# Patient Record
Sex: FEMALE | Race: WHITE | NOT HISPANIC OR LATINO | Employment: UNEMPLOYED | URBAN - METROPOLITAN AREA
[De-identification: names, ages, dates, MRNs, and addresses within clinical notes are randomized per-mention and may not be internally consistent; named-entity substitution may affect disease eponyms.]

---

## 2021-01-13 LAB
ABO + RH BLD: NORMAL
C TRACH RRNA SPEC QL PROBE: NEGATIVE
HBV SURFACE AG SERPL QL IA: NEGATIVE
HIV 1+2 AB+HIV1 P24 AG SERPL QL IA: NEGATIVE
INDIRECT COOMBS: NEGATIVE
N GONORRHOEAE, AMPLIFIED DNA: NEGATIVE
RPR: NON REACTIVE
RUBELLA IMMUNE STATUS: NORMAL

## 2021-08-02 ENCOUNTER — HOSPITAL ENCOUNTER (OUTPATIENT)
Facility: HOSPITAL | Age: 36
Discharge: HOME | End: 2021-08-02
Attending: OBSTETRICS & GYNECOLOGY | Admitting: OBSTETRICS & GYNECOLOGY
Payer: COMMERCIAL

## 2021-08-02 VITALS — SYSTOLIC BLOOD PRESSURE: 133 MMHG | HEART RATE: 93 BPM | DIASTOLIC BLOOD PRESSURE: 82 MMHG

## 2021-08-02 DIAGNOSIS — O09.529 AMA (ADVANCED MATERNAL AGE) MULTIGRAVIDA 35+: ICD-10-CM

## 2021-08-02 PROCEDURE — 59025 FETAL NON-STRESS TEST: CPT

## 2021-08-05 ENCOUNTER — HOSPITAL ENCOUNTER (OUTPATIENT)
Facility: HOSPITAL | Age: 36
Discharge: HOME | End: 2021-08-06
Attending: OBSTETRICS & GYNECOLOGY | Admitting: OBSTETRICS & GYNECOLOGY
Payer: COMMERCIAL

## 2021-08-05 DIAGNOSIS — O24.419 GESTATIONAL DIABETES: ICD-10-CM

## 2021-08-05 LAB
ALBUMIN SERPL BCP-MCNC: 2.9 G/DL (ref 3.5–5.2)
ALP SERPL-CCNC: 109 U/L (ref 55–135)
ALT SERPL W/O P-5'-P-CCNC: 20 U/L (ref 10–44)
ANION GAP SERPL CALC-SCNC: 10 MMOL/L (ref 8–16)
AST SERPL-CCNC: 24 U/L (ref 10–40)
BACTERIA #/AREA URNS HPF: NEGATIVE /HPF
BILIRUB SERPL-MCNC: 0.5 MG/DL (ref 0.1–1)
BILIRUB UR QL STRIP: NEGATIVE
BUN SERPL-MCNC: 15 MG/DL (ref 6–20)
CALCIUM SERPL-MCNC: 9.5 MG/DL (ref 8.7–10.5)
CHLORIDE SERPL-SCNC: 103 MMOL/L (ref 95–110)
CLARITY UR: CLEAR
CO2 SERPL-SCNC: 22 MMOL/L (ref 23–29)
COLOR UR: YELLOW
CREAT SERPL-MCNC: 0.8 MG/DL (ref 0.5–1.4)
ERYTHROCYTE [DISTWIDTH] IN BLOOD BY AUTOMATED COUNT: 12.5 % (ref 11.5–14.5)
EST. GFR  (AFRICAN AMERICAN): >60 ML/MIN/1.73 M^2
EST. GFR  (NON AFRICAN AMERICAN): >60 ML/MIN/1.73 M^2
GLUCOSE SERPL-MCNC: 120 MG/DL (ref 70–110)
GLUCOSE SERPL-MCNC: 78 MG/DL (ref 70–110)
GLUCOSE UR QL STRIP: NEGATIVE
HCT VFR BLD AUTO: 33.7 % (ref 37–48.5)
HGB BLD-MCNC: 11.5 G/DL (ref 12–16)
HGB UR QL STRIP: NEGATIVE
HYALINE CASTS #/AREA URNS LPF: 0 /LPF
KETONES UR QL STRIP: NEGATIVE
LEUKOCYTE ESTERASE UR QL STRIP: ABNORMAL
MCH RBC QN AUTO: 32.1 PG (ref 27–31)
MCHC RBC AUTO-ENTMCNC: 34.1 G/DL (ref 32–36)
MCV RBC AUTO: 94 FL (ref 82–98)
MICROSCOPIC COMMENT: ABNORMAL
NITRITE UR QL STRIP: NEGATIVE
PH UR STRIP: 6 [PH] (ref 5–8)
PLATELET # BLD AUTO: 243 K/UL (ref 150–450)
PMV BLD AUTO: 11.1 FL (ref 9.2–12.9)
POTASSIUM SERPL-SCNC: 3.7 MMOL/L (ref 3.5–5.1)
PROT SERPL-MCNC: 6 G/DL (ref 6–8.4)
PROT UR QL STRIP: NEGATIVE
RBC # BLD AUTO: 3.58 M/UL (ref 4–5.4)
RBC #/AREA URNS HPF: 0 /HPF (ref 0–4)
SODIUM SERPL-SCNC: 135 MMOL/L (ref 136–145)
SP GR UR STRIP: 1.01 (ref 1–1.03)
SQUAMOUS #/AREA URNS HPF: 1 /HPF
URN SPEC COLLECT METH UR: ABNORMAL
UROBILINOGEN UR STRIP-ACNC: NEGATIVE EU/DL
WBC # BLD AUTO: 10.93 K/UL (ref 3.9–12.7)
WBC #/AREA URNS HPF: 2 /HPF (ref 0–5)

## 2021-08-05 PROCEDURE — 80053 COMPREHEN METABOLIC PANEL: CPT | Performed by: OBSTETRICS & GYNECOLOGY

## 2021-08-05 PROCEDURE — 59025 FETAL NON-STRESS TEST: CPT

## 2021-08-05 PROCEDURE — 85027 COMPLETE CBC AUTOMATED: CPT | Performed by: OBSTETRICS & GYNECOLOGY

## 2021-08-05 PROCEDURE — 36415 COLL VENOUS BLD VENIPUNCTURE: CPT | Performed by: OBSTETRICS & GYNECOLOGY

## 2021-08-05 PROCEDURE — 81001 URINALYSIS AUTO W/SCOPE: CPT | Performed by: OBSTETRICS & GYNECOLOGY

## 2021-08-06 VITALS
HEART RATE: 81 BPM | RESPIRATION RATE: 16 BRPM | WEIGHT: 117 LBS | OXYGEN SATURATION: 98 % | TEMPERATURE: 98 F | HEIGHT: 62 IN | SYSTOLIC BLOOD PRESSURE: 124 MMHG | BODY MASS INDEX: 21.53 KG/M2 | DIASTOLIC BLOOD PRESSURE: 69 MMHG

## 2021-08-06 LAB
GLUCOSE SERPL-MCNC: 114 MG/DL (ref 70–110)
GLUCOSE SERPL-MCNC: 73 MG/DL (ref 70–110)
GLUCOSE SERPL-MCNC: 78 MG/DL (ref 70–110)
PROT 24H UR-MRATE: 108 MG/SPEC (ref 6–100)
PROT UR-MCNC: 9 MG/DL (ref 0–15)
URINE COLLECTION DURATION: 24 HR
URINE VOLUME: 1200 ML

## 2021-08-06 PROCEDURE — G0378 HOSPITAL OBSERVATION PER HR: HCPCS

## 2021-08-06 PROCEDURE — 99211 OFF/OP EST MAY X REQ PHY/QHP: CPT

## 2021-08-06 PROCEDURE — 84156 ASSAY OF PROTEIN URINE: CPT | Performed by: OBSTETRICS & GYNECOLOGY

## 2021-08-06 PROCEDURE — 25000003 PHARM REV CODE 250: Performed by: OBSTETRICS & GYNECOLOGY

## 2021-08-06 RX ORDER — ACETAMINOPHEN 325 MG/1
650 TABLET ORAL EVERY 6 HOURS PRN
Status: DISCONTINUED | OUTPATIENT
Start: 2021-08-06 | End: 2021-08-06 | Stop reason: HOSPADM

## 2021-08-06 RX ORDER — LABETALOL 100 MG/1
100 TABLET, FILM COATED ORAL EVERY 12 HOURS
Status: DISCONTINUED | OUTPATIENT
Start: 2021-08-06 | End: 2021-08-06 | Stop reason: HOSPADM

## 2021-08-06 RX ADMIN — LABETALOL HCL 100 MG: 100 TABLET, FILM COATED ORAL at 09:08

## 2021-08-06 RX ADMIN — ACETAMINOPHEN 650 MG: 325 TABLET, FILM COATED ORAL at 02:08

## 2021-08-09 ENCOUNTER — HOSPITAL ENCOUNTER (OUTPATIENT)
Facility: HOSPITAL | Age: 36
Discharge: HOME | End: 2021-08-09
Attending: OBSTETRICS & GYNECOLOGY | Admitting: OBSTETRICS & GYNECOLOGY
Payer: COMMERCIAL

## 2021-08-09 VITALS — DIASTOLIC BLOOD PRESSURE: 78 MMHG | SYSTOLIC BLOOD PRESSURE: 146 MMHG | RESPIRATION RATE: 16 BRPM | HEART RATE: 76 BPM

## 2021-08-09 DIAGNOSIS — O24.419 GESTATIONAL DIABETES: ICD-10-CM

## 2021-08-09 PROCEDURE — 59025 FETAL NON-STRESS TEST: CPT

## 2021-08-10 ENCOUNTER — HOSPITAL ENCOUNTER (OUTPATIENT)
Facility: HOSPITAL | Age: 36
Discharge: HOME | End: 2021-08-10
Attending: OBSTETRICS & GYNECOLOGY | Admitting: OBSTETRICS & GYNECOLOGY
Payer: COMMERCIAL

## 2021-08-10 DIAGNOSIS — O36.5990 IUGR, ANTENATAL: ICD-10-CM

## 2021-08-10 PROCEDURE — 96372 THER/PROPH/DIAG INJ SC/IM: CPT

## 2021-08-10 PROCEDURE — 63600175 PHARM REV CODE 636 W HCPCS: Performed by: OBSTETRICS & GYNECOLOGY

## 2021-08-10 RX ORDER — BETAMETHASONE SODIUM PHOSPHATE AND BETAMETHASONE ACETATE 3; 3 MG/ML; MG/ML
12 INJECTION, SUSPENSION INTRA-ARTICULAR; INTRALESIONAL; INTRAMUSCULAR; SOFT TISSUE ONCE
Status: COMPLETED | OUTPATIENT
Start: 2021-08-10 | End: 2021-08-10

## 2021-08-10 RX ADMIN — BETAMETHASONE ACETATE AND BETAMETHASONE SODIUM PHOSPHATE 12 MG: 3; 3 INJECTION, SUSPENSION INTRA-ARTICULAR; INTRALESIONAL; INTRAMUSCULAR; SOFT TISSUE at 11:08

## 2021-08-11 ENCOUNTER — HOSPITAL ENCOUNTER (OUTPATIENT)
Facility: HOSPITAL | Age: 36
Discharge: HOME | End: 2021-08-11
Attending: OBSTETRICS & GYNECOLOGY | Admitting: OBSTETRICS & GYNECOLOGY
Payer: COMMERCIAL

## 2021-08-11 DIAGNOSIS — O36.5990 IUGR (INTRAUTERINE GROWTH RESTRICTION) AFFECTING CARE OF MOTHER: ICD-10-CM

## 2021-08-11 PROCEDURE — 63600175 PHARM REV CODE 636 W HCPCS: Performed by: OBSTETRICS & GYNECOLOGY

## 2021-08-11 PROCEDURE — 96372 THER/PROPH/DIAG INJ SC/IM: CPT

## 2021-08-11 RX ORDER — BETAMETHASONE SODIUM PHOSPHATE AND BETAMETHASONE ACETATE 3; 3 MG/ML; MG/ML
12 INJECTION, SUSPENSION INTRA-ARTICULAR; INTRALESIONAL; INTRAMUSCULAR; SOFT TISSUE ONCE
Status: COMPLETED | OUTPATIENT
Start: 2021-08-11 | End: 2021-08-11

## 2021-08-11 RX ADMIN — BETAMETHASONE ACETATE AND BETAMETHASONE SODIUM PHOSPHATE 12 MG: 3; 3 INJECTION, SUSPENSION INTRA-ARTICULAR; INTRALESIONAL; INTRAMUSCULAR; SOFT TISSUE at 11:08

## 2021-08-12 ENCOUNTER — HOSPITAL ENCOUNTER (OUTPATIENT)
Facility: HOSPITAL | Age: 36
Discharge: HOME | End: 2021-08-12
Attending: OBSTETRICS & GYNECOLOGY | Admitting: OBSTETRICS & GYNECOLOGY
Payer: COMMERCIAL

## 2021-08-12 VITALS — RESPIRATION RATE: 16 BRPM | DIASTOLIC BLOOD PRESSURE: 62 MMHG | HEART RATE: 88 BPM | SYSTOLIC BLOOD PRESSURE: 127 MMHG

## 2021-08-12 DIAGNOSIS — O24.419 GESTATIONAL DIABETES: ICD-10-CM

## 2021-08-12 PROCEDURE — 59025 FETAL NON-STRESS TEST: CPT

## 2021-08-19 ENCOUNTER — ANESTHESIA EVENT (OUTPATIENT)
Dept: OBSTETRICS AND GYNECOLOGY | Facility: HOSPITAL | Age: 36
End: 2021-08-19
Payer: COMMERCIAL

## 2021-08-19 ENCOUNTER — ANESTHESIA (OUTPATIENT)
Dept: OBSTETRICS AND GYNECOLOGY | Facility: HOSPITAL | Age: 36
End: 2021-08-19
Payer: COMMERCIAL

## 2021-08-19 ENCOUNTER — HOSPITAL ENCOUNTER (INPATIENT)
Facility: HOSPITAL | Age: 36
LOS: 2 days | Discharge: HOME | End: 2021-08-21
Attending: OBSTETRICS & GYNECOLOGY | Admitting: OBSTETRICS & GYNECOLOGY
Payer: COMMERCIAL

## 2021-08-19 DIAGNOSIS — Z34.90 ENCOUNTER FOR ELECTIVE INDUCTION OF LABOR: ICD-10-CM

## 2021-08-19 LAB
ABO + RH BLD: NORMAL
ALBUMIN SERPL BCP-MCNC: 2.8 G/DL (ref 3.5–5.2)
ALP SERPL-CCNC: 140 U/L (ref 55–135)
ALT SERPL W/O P-5'-P-CCNC: 21 U/L (ref 10–44)
AMPHET+METHAMPHET UR QL: NEGATIVE
ANION GAP SERPL CALC-SCNC: 11 MMOL/L (ref 8–16)
AST SERPL-CCNC: 21 U/L (ref 10–40)
BARBITURATES UR QL SCN>200 NG/ML: NEGATIVE
BASOPHILS # BLD AUTO: 0.02 K/UL (ref 0–0.2)
BASOPHILS NFR BLD: 0.2 % (ref 0–1.9)
BENZODIAZ UR QL SCN>200 NG/ML: NEGATIVE
BILIRUB SERPL-MCNC: 0.7 MG/DL (ref 0.1–1)
BILIRUB UR QL STRIP: NEGATIVE
BLD GP AB SCN CELLS X3 SERPL QL: NORMAL
BUN SERPL-MCNC: 21 MG/DL (ref 6–20)
BUPRENORPHINE UR QL: NEGATIVE
BZE UR QL SCN: NEGATIVE
CALCIUM SERPL-MCNC: 9.1 MG/DL (ref 8.7–10.5)
CANNABINOIDS UR QL SCN: NEGATIVE
CHLORIDE SERPL-SCNC: 105 MMOL/L (ref 95–110)
CLARITY UR: CLEAR
CO2 SERPL-SCNC: 19 MMOL/L (ref 23–29)
COLOR UR: YELLOW
CREAT SERPL-MCNC: 0.7 MG/DL (ref 0.5–1.4)
CREAT UR-MCNC: 51 MG/DL (ref 15–325)
DIFFERENTIAL METHOD: ABNORMAL
EOSINOPHIL # BLD AUTO: 0.2 K/UL (ref 0–0.5)
EOSINOPHIL NFR BLD: 1.7 % (ref 0–8)
ERYTHROCYTE [DISTWIDTH] IN BLOOD BY AUTOMATED COUNT: 12.7 % (ref 11.5–14.5)
EST. GFR  (AFRICAN AMERICAN): >60 ML/MIN/1.73 M^2
EST. GFR  (NON AFRICAN AMERICAN): >60 ML/MIN/1.73 M^2
GLUCOSE SERPL-MCNC: 93 MG/DL (ref 70–110)
GLUCOSE UR QL STRIP: NEGATIVE
HCT VFR BLD AUTO: 36.6 % (ref 37–48.5)
HGB BLD-MCNC: 12.8 G/DL (ref 12–16)
HGB UR QL STRIP: NEGATIVE
IMM GRANULOCYTES # BLD AUTO: 0.06 K/UL (ref 0–0.04)
IMM GRANULOCYTES NFR BLD AUTO: 0.6 % (ref 0–0.5)
KETONES UR QL STRIP: NEGATIVE
LEUKOCYTE ESTERASE UR QL STRIP: NEGATIVE
LYMPHOCYTES # BLD AUTO: 2.5 K/UL (ref 1–4.8)
LYMPHOCYTES NFR BLD: 25.5 % (ref 18–48)
MCH RBC QN AUTO: 32.8 PG (ref 27–31)
MCHC RBC AUTO-ENTMCNC: 35 G/DL (ref 32–36)
MCV RBC AUTO: 94 FL (ref 82–98)
MONOCYTES # BLD AUTO: 1 K/UL (ref 0.3–1)
MONOCYTES NFR BLD: 10.1 % (ref 4–15)
NEUTROPHILS # BLD AUTO: 6 K/UL (ref 1.8–7.7)
NEUTROPHILS NFR BLD: 61.9 % (ref 38–73)
NITRITE UR QL STRIP: NEGATIVE
NRBC BLD-RTO: 0 /100 WBC
OPIATES UR QL SCN: NEGATIVE
PCP UR QL SCN>25 NG/ML: NEGATIVE
PH UR STRIP: 6 [PH] (ref 5–8)
PLATELET # BLD AUTO: 239 K/UL (ref 150–450)
PMV BLD AUTO: 10.8 FL (ref 9.2–12.9)
POTASSIUM SERPL-SCNC: 4 MMOL/L (ref 3.5–5.1)
PROT SERPL-MCNC: 6.3 G/DL (ref 6–8.4)
PROT UR QL STRIP: NEGATIVE
RBC # BLD AUTO: 3.9 M/UL (ref 4–5.4)
RPR SER QL: NORMAL
SARS-COV-2 RDRP RESP QL NAA+PROBE: NEGATIVE
SODIUM SERPL-SCNC: 135 MMOL/L (ref 136–145)
SP GR UR STRIP: 1.01 (ref 1–1.03)
TOXICOLOGY INFORMATION: NORMAL
URN SPEC COLLECT METH UR: NORMAL
UROBILINOGEN UR STRIP-ACNC: NEGATIVE EU/DL
WBC # BLD AUTO: 9.75 K/UL (ref 3.9–12.7)

## 2021-08-19 PROCEDURE — 80307 DRUG TEST PRSMV CHEM ANLYZR: CPT | Performed by: OBSTETRICS & GYNECOLOGY

## 2021-08-19 PROCEDURE — 25000003 PHARM REV CODE 250: Performed by: OBSTETRICS & GYNECOLOGY

## 2021-08-19 PROCEDURE — 85025 COMPLETE CBC W/AUTO DIFF WBC: CPT | Performed by: OBSTETRICS & GYNECOLOGY

## 2021-08-19 PROCEDURE — 62326 NJX INTERLAMINAR LMBR/SAC: CPT | Performed by: STUDENT IN AN ORGANIZED HEALTH CARE EDUCATION/TRAINING PROGRAM

## 2021-08-19 PROCEDURE — 81003 URINALYSIS AUTO W/O SCOPE: CPT | Performed by: OBSTETRICS & GYNECOLOGY

## 2021-08-19 PROCEDURE — 63600175 PHARM REV CODE 636 W HCPCS: Performed by: OBSTETRICS & GYNECOLOGY

## 2021-08-19 PROCEDURE — 86592 SYPHILIS TEST NON-TREP QUAL: CPT | Performed by: OBSTETRICS & GYNECOLOGY

## 2021-08-19 PROCEDURE — 80053 COMPREHEN METABOLIC PANEL: CPT | Performed by: OBSTETRICS & GYNECOLOGY

## 2021-08-19 PROCEDURE — 12000002 HC ACUTE/MED SURGE SEMI-PRIVATE ROOM

## 2021-08-19 PROCEDURE — 27200710 HC EPIDURAL INFUSION PUMP SET: Performed by: STUDENT IN AN ORGANIZED HEALTH CARE EDUCATION/TRAINING PROGRAM

## 2021-08-19 PROCEDURE — 72200004 HC VAGINAL DELIVERY LEVEL I

## 2021-08-19 PROCEDURE — 25000003 PHARM REV CODE 250: Performed by: ANESTHESIOLOGY

## 2021-08-19 PROCEDURE — C1751 CATH, INF, PER/CENT/MIDLINE: HCPCS | Performed by: STUDENT IN AN ORGANIZED HEALTH CARE EDUCATION/TRAINING PROGRAM

## 2021-08-19 PROCEDURE — 86900 BLOOD TYPING SEROLOGIC ABO: CPT | Performed by: OBSTETRICS & GYNECOLOGY

## 2021-08-19 PROCEDURE — U0002 COVID-19 LAB TEST NON-CDC: HCPCS | Performed by: OBSTETRICS & GYNECOLOGY

## 2021-08-19 RX ORDER — DIPHENHYDRAMINE HYDROCHLORIDE 50 MG/ML
12.5 INJECTION INTRAMUSCULAR; INTRAVENOUS EVERY 4 HOURS PRN
Status: DISCONTINUED | OUTPATIENT
Start: 2021-08-19 | End: 2021-08-19

## 2021-08-19 RX ORDER — SODIUM CHLORIDE 0.9 % (FLUSH) 0.9 %
10 SYRINGE (ML) INJECTION
Status: DISCONTINUED | OUTPATIENT
Start: 2021-08-19 | End: 2021-08-21 | Stop reason: HOSPADM

## 2021-08-19 RX ORDER — BUPIVACAINE HYDROCHLORIDE 5 MG/ML
INJECTION, SOLUTION EPIDURAL; INTRACAUDAL
Status: DISCONTINUED | OUTPATIENT
Start: 2021-08-19 | End: 2021-08-19

## 2021-08-19 RX ORDER — PROCHLORPERAZINE EDISYLATE 5 MG/ML
5 INJECTION INTRAMUSCULAR; INTRAVENOUS EVERY 6 HOURS PRN
Status: DISCONTINUED | OUTPATIENT
Start: 2021-08-19 | End: 2021-08-21 | Stop reason: HOSPADM

## 2021-08-19 RX ORDER — LIDOCAINE HYDROCHLORIDE 20 MG/ML
10 INJECTION, SOLUTION EPIDURAL; INFILTRATION; INTRACAUDAL; PERINEURAL ONCE
Status: COMPLETED | OUTPATIENT
Start: 2021-08-19 | End: 2021-08-19

## 2021-08-19 RX ORDER — EPHEDRINE SULFATE 50 MG/ML
10 INJECTION, SOLUTION INTRAVENOUS ONCE AS NEEDED
Status: DISCONTINUED | OUTPATIENT
Start: 2021-08-19 | End: 2021-08-19

## 2021-08-19 RX ORDER — CALCIUM CARBONATE 200(500)MG
500 TABLET,CHEWABLE ORAL 3 TIMES DAILY PRN
Status: DISCONTINUED | OUTPATIENT
Start: 2021-08-19 | End: 2021-08-19

## 2021-08-19 RX ORDER — METHYLERGONOVINE MALEATE 0.2 MG/ML
200 INJECTION INTRAVENOUS
Status: DISCONTINUED | OUTPATIENT
Start: 2021-08-19 | End: 2021-08-19

## 2021-08-19 RX ORDER — ONDANSETRON 2 MG/ML
4 INJECTION INTRAMUSCULAR; INTRAVENOUS EVERY 6 HOURS PRN
Status: DISCONTINUED | OUTPATIENT
Start: 2021-08-19 | End: 2021-08-19

## 2021-08-19 RX ORDER — DIPHENHYDRAMINE HCL 25 MG
25 CAPSULE ORAL EVERY 4 HOURS PRN
Status: DISCONTINUED | OUTPATIENT
Start: 2021-08-19 | End: 2021-08-21 | Stop reason: HOSPADM

## 2021-08-19 RX ORDER — SODIUM CHLORIDE 9 MG/ML
INJECTION, SOLUTION INTRAVENOUS
Status: DISCONTINUED | OUTPATIENT
Start: 2021-08-19 | End: 2021-08-19

## 2021-08-19 RX ORDER — OXYCODONE AND ACETAMINOPHEN 5; 325 MG/1; MG/1
1 TABLET ORAL EVERY 4 HOURS PRN
Status: DISCONTINUED | OUTPATIENT
Start: 2021-08-19 | End: 2021-08-21 | Stop reason: HOSPADM

## 2021-08-19 RX ORDER — DOCUSATE SODIUM 100 MG/1
200 CAPSULE, LIQUID FILLED ORAL 2 TIMES DAILY PRN
Status: DISCONTINUED | OUTPATIENT
Start: 2021-08-19 | End: 2021-08-21 | Stop reason: HOSPADM

## 2021-08-19 RX ORDER — FENTANYL/BUPIVACAINE/NS/PF 2-625MCG/1
PLASTIC BAG, INJECTION (ML) INJECTION
Status: DISPENSED
Start: 2021-08-19 | End: 2021-08-19

## 2021-08-19 RX ORDER — ONDANSETRON 4 MG/1
8 TABLET, ORALLY DISINTEGRATING ORAL EVERY 8 HOURS PRN
Status: DISCONTINUED | OUTPATIENT
Start: 2021-08-19 | End: 2021-08-21 | Stop reason: HOSPADM

## 2021-08-19 RX ORDER — LABETALOL 200 MG/1
200 TABLET, FILM COATED ORAL EVERY 12 HOURS
Status: DISCONTINUED | OUTPATIENT
Start: 2021-08-19 | End: 2021-08-19

## 2021-08-19 RX ORDER — BUTORPHANOL TARTRATE 2 MG/ML
1 INJECTION INTRAMUSCULAR; INTRAVENOUS
Status: DISCONTINUED | OUTPATIENT
Start: 2021-08-19 | End: 2021-08-19

## 2021-08-19 RX ORDER — MISOPROSTOL 200 UG/1
800 TABLET ORAL
Status: DISCONTINUED | OUTPATIENT
Start: 2021-08-19 | End: 2021-08-19

## 2021-08-19 RX ORDER — LABETALOL 100 MG/1
100 TABLET, FILM COATED ORAL ONCE
Status: DISCONTINUED | OUTPATIENT
Start: 2021-08-19 | End: 2021-08-19

## 2021-08-19 RX ORDER — CARBOPROST TROMETHAMINE 250 UG/ML
250 INJECTION, SOLUTION INTRAMUSCULAR
Status: DISCONTINUED | OUTPATIENT
Start: 2021-08-19 | End: 2021-08-19

## 2021-08-19 RX ORDER — FENTANYL/BUPIVACAINE/NS/PF 2-625MCG/1
14 PLASTIC BAG, INJECTION (ML) INJECTION CONTINUOUS
Status: DISCONTINUED | OUTPATIENT
Start: 2021-08-19 | End: 2021-08-19

## 2021-08-19 RX ORDER — HYDROCORTISONE 25 MG/G
CREAM TOPICAL 3 TIMES DAILY PRN
Status: DISCONTINUED | OUTPATIENT
Start: 2021-08-19 | End: 2021-08-21 | Stop reason: HOSPADM

## 2021-08-19 RX ORDER — OXYCODONE AND ACETAMINOPHEN 10; 325 MG/1; MG/1
1 TABLET ORAL EVERY 4 HOURS PRN
Status: DISCONTINUED | OUTPATIENT
Start: 2021-08-19 | End: 2021-08-21 | Stop reason: HOSPADM

## 2021-08-19 RX ORDER — NALOXONE HCL 0.4 MG/ML
0.4 VIAL (ML) INJECTION SEE ADMIN INSTRUCTIONS
Status: DISCONTINUED | OUTPATIENT
Start: 2021-08-19 | End: 2021-08-19

## 2021-08-19 RX ORDER — LABETALOL 200 MG/1
200 TABLET, FILM COATED ORAL 2 TIMES DAILY
COMMUNITY
End: 2022-03-15

## 2021-08-19 RX ORDER — SODIUM CHLORIDE, SODIUM LACTATE, POTASSIUM CHLORIDE, CALCIUM CHLORIDE 600; 310; 30; 20 MG/100ML; MG/100ML; MG/100ML; MG/100ML
INJECTION, SOLUTION INTRAVENOUS CONTINUOUS
Status: DISCONTINUED | OUTPATIENT
Start: 2021-08-19 | End: 2021-08-19

## 2021-08-19 RX ORDER — SIMETHICONE 80 MG
1 TABLET,CHEWABLE ORAL EVERY 6 HOURS PRN
Status: DISCONTINUED | OUTPATIENT
Start: 2021-08-19 | End: 2021-08-21 | Stop reason: HOSPADM

## 2021-08-19 RX ORDER — PRENATAL WITH FERROUS FUM AND FOLIC ACID 3080; 920; 120; 400; 22; 1.84; 3; 20; 10; 1; 12; 200; 27; 25; 2 [IU]/1; [IU]/1; MG/1; [IU]/1; MG/1; MG/1; MG/1; MG/1; MG/1; MG/1; UG/1; MG/1; MG/1; MG/1; MG/1
1 TABLET ORAL DAILY
Status: DISCONTINUED | OUTPATIENT
Start: 2021-08-20 | End: 2021-08-21 | Stop reason: HOSPADM

## 2021-08-19 RX ORDER — LABETALOL 200 MG/1
200 TABLET, FILM COATED ORAL EVERY 12 HOURS
Status: DISCONTINUED | OUTPATIENT
Start: 2021-08-19 | End: 2021-08-20

## 2021-08-19 RX ORDER — OXYTOCIN-SODIUM CHLORIDE 0.9% IV SOLN 30 UNIT/500ML 30-0.9/5 UT/ML-%
30 SOLUTION INTRAVENOUS ONCE
Status: COMPLETED | OUTPATIENT
Start: 2021-08-19 | End: 2021-08-19

## 2021-08-19 RX ORDER — ONDANSETRON 4 MG/1
8 TABLET, ORALLY DISINTEGRATING ORAL EVERY 8 HOURS PRN
Status: DISCONTINUED | OUTPATIENT
Start: 2021-08-19 | End: 2021-08-19

## 2021-08-19 RX ORDER — OXYTOCIN-SODIUM CHLORIDE 0.9% IV SOLN 30 UNIT/500ML 30-0.9/5 UT/ML-%
0-30 SOLUTION INTRAVENOUS CONTINUOUS
Status: DISCONTINUED | OUTPATIENT
Start: 2021-08-19 | End: 2021-08-19

## 2021-08-19 RX ADMIN — DEXTROSE 3 MILLION UNITS: 50 INJECTION, SOLUTION INTRAVENOUS at 09:08

## 2021-08-19 RX ADMIN — DEXTROSE 3 MILLION UNITS: 50 INJECTION, SOLUTION INTRAVENOUS at 01:08

## 2021-08-19 RX ADMIN — SODIUM CHLORIDE: 0.9 INJECTION, SOLUTION INTRAVENOUS at 03:08

## 2021-08-19 RX ADMIN — BENZOCAINE AND LEVOMENTHOL: 200; 5 SPRAY TOPICAL at 08:08

## 2021-08-19 RX ADMIN — OXYCODONE HYDROCHLORIDE AND ACETAMINOPHEN 1 TABLET: 10; 325 TABLET ORAL at 08:08

## 2021-08-19 RX ADMIN — Medication 30 UNITS: at 03:08

## 2021-08-19 RX ADMIN — LABETALOL HYDROCHLORIDE 200 MG: 200 TABLET, FILM COATED ORAL at 09:08

## 2021-08-19 RX ADMIN — IBUPROFEN 600 MG: 400 TABLET ORAL at 06:08

## 2021-08-19 RX ADMIN — BUPIVACAINE HYDROCHLORIDE 4 MG: 5 INJECTION, SOLUTION EPIDURAL; INTRACAUDAL; PERINEURAL at 03:08

## 2021-08-19 RX ADMIN — LIDOCAINE HYDROCHLORIDE 200 MG: 20 INJECTION, SOLUTION EPIDURAL; INFILTRATION; INTRACAUDAL; PERINEURAL at 03:08

## 2021-08-19 RX ADMIN — SODIUM CHLORIDE, SODIUM LACTATE, POTASSIUM CHLORIDE, AND CALCIUM CHLORIDE 1000 ML: .6; .31; .03; .02 INJECTION, SOLUTION INTRAVENOUS at 10:08

## 2021-08-19 RX ADMIN — LABETALOL HYDROCHLORIDE 200 MG: 200 TABLET, FILM COATED ORAL at 08:08

## 2021-08-19 RX ADMIN — DEXTROSE 3 MILLION UNITS: 50 INJECTION, SOLUTION INTRAVENOUS at 05:08

## 2021-08-19 RX ADMIN — PENICILLIN G POTASSIUM 5 MILLION UNITS: 5000000 POWDER, FOR SOLUTION INTRAMUSCULAR; INTRAPLEURAL; INTRATHECAL; INTRAVENOUS at 01:08

## 2021-08-19 RX ADMIN — SODIUM CHLORIDE, SODIUM LACTATE, POTASSIUM CHLORIDE, AND CALCIUM CHLORIDE: .6; .31; .03; .02 INJECTION, SOLUTION INTRAVENOUS at 01:08

## 2021-08-19 RX ADMIN — Medication 2 MILLI-UNITS/MIN: at 02:08

## 2021-08-20 LAB
BASOPHILS # BLD AUTO: 0.02 K/UL (ref 0–0.2)
BASOPHILS NFR BLD: 0.2 % (ref 0–1.9)
DIFFERENTIAL METHOD: ABNORMAL
EOSINOPHIL # BLD AUTO: 0.1 K/UL (ref 0–0.5)
EOSINOPHIL NFR BLD: 1 % (ref 0–8)
ERYTHROCYTE [DISTWIDTH] IN BLOOD BY AUTOMATED COUNT: 12.7 % (ref 11.5–14.5)
HCT VFR BLD AUTO: 30.3 % (ref 37–48.5)
HGB BLD-MCNC: 10 G/DL (ref 12–16)
IMM GRANULOCYTES # BLD AUTO: 0.08 K/UL (ref 0–0.04)
IMM GRANULOCYTES NFR BLD AUTO: 0.6 % (ref 0–0.5)
LYMPHOCYTES # BLD AUTO: 2.6 K/UL (ref 1–4.8)
LYMPHOCYTES NFR BLD: 20.1 % (ref 18–48)
MCH RBC QN AUTO: 31.9 PG (ref 27–31)
MCHC RBC AUTO-ENTMCNC: 33 G/DL (ref 32–36)
MCV RBC AUTO: 97 FL (ref 82–98)
MONOCYTES # BLD AUTO: 1.1 K/UL (ref 0.3–1)
MONOCYTES NFR BLD: 8.7 % (ref 4–15)
NEUTROPHILS # BLD AUTO: 9 K/UL (ref 1.8–7.7)
NEUTROPHILS NFR BLD: 69.4 % (ref 38–73)
NRBC BLD-RTO: 0 /100 WBC
PLATELET # BLD AUTO: 184 K/UL (ref 150–450)
PMV BLD AUTO: 10.8 FL (ref 9.2–12.9)
RBC # BLD AUTO: 3.13 M/UL (ref 4–5.4)
WBC # BLD AUTO: 12.93 K/UL (ref 3.9–12.7)

## 2021-08-20 PROCEDURE — 12000002 HC ACUTE/MED SURGE SEMI-PRIVATE ROOM

## 2021-08-20 PROCEDURE — 25000003 PHARM REV CODE 250: Performed by: OBSTETRICS & GYNECOLOGY

## 2021-08-20 PROCEDURE — 85025 COMPLETE CBC W/AUTO DIFF WBC: CPT | Performed by: OBSTETRICS & GYNECOLOGY

## 2021-08-20 PROCEDURE — 36415 COLL VENOUS BLD VENIPUNCTURE: CPT | Performed by: OBSTETRICS & GYNECOLOGY

## 2021-08-20 RX ORDER — LABETALOL 200 MG/1
200 TABLET, FILM COATED ORAL EVERY 12 HOURS
Status: DISCONTINUED | OUTPATIENT
Start: 2021-08-20 | End: 2021-08-20

## 2021-08-20 RX ORDER — LABETALOL 100 MG/1
200 TABLET, FILM COATED ORAL EVERY 12 HOURS
Status: DISCONTINUED | OUTPATIENT
Start: 2021-08-20 | End: 2021-08-21 | Stop reason: HOSPADM

## 2021-08-20 RX ADMIN — DOCUSATE SODIUM 200 MG: 100 CAPSULE, LIQUID FILLED ORAL at 08:08

## 2021-08-20 RX ADMIN — LABETALOL HYDROCHLORIDE 200 MG: 100 TABLET, FILM COATED ORAL at 09:08

## 2021-08-20 RX ADMIN — LABETALOL HYDROCHLORIDE 200 MG: 200 TABLET, FILM COATED ORAL at 08:08

## 2021-08-20 RX ADMIN — OXYCODONE HYDROCHLORIDE AND ACETAMINOPHEN 1 TABLET: 10; 325 TABLET ORAL at 03:08

## 2021-08-20 RX ADMIN — OXYCODONE HYDROCHLORIDE AND ACETAMINOPHEN 1 TABLET: 10; 325 TABLET ORAL at 07:08

## 2021-08-20 RX ADMIN — PRENATAL VIT W/ FE FUMARATE-FA TAB 27-0.8 MG 1 TABLET: 27-0.8 TAB at 08:08

## 2021-08-21 VITALS
WEIGHT: 120 LBS | HEART RATE: 91 BPM | TEMPERATURE: 99 F | HEIGHT: 62 IN | RESPIRATION RATE: 17 BRPM | SYSTOLIC BLOOD PRESSURE: 135 MMHG | BODY MASS INDEX: 22.08 KG/M2 | OXYGEN SATURATION: 99 % | DIASTOLIC BLOOD PRESSURE: 79 MMHG

## 2021-08-21 PROCEDURE — 25000003 PHARM REV CODE 250: Performed by: OBSTETRICS & GYNECOLOGY

## 2021-08-21 PROCEDURE — 25000003 PHARM REV CODE 250: Performed by: SPECIALIST

## 2021-08-21 RX ORDER — IBUPROFEN 600 MG/1
600 TABLET ORAL EVERY 6 HOURS PRN
Refills: 0
Start: 2021-08-21 | End: 2022-03-15

## 2021-08-21 RX ADMIN — LABETALOL HYDROCHLORIDE 200 MG: 100 TABLET, FILM COATED ORAL at 08:08

## 2021-08-21 RX ADMIN — IBUPROFEN 600 MG: 400 TABLET ORAL at 08:08

## 2021-08-21 RX ADMIN — DOCUSATE SODIUM 200 MG: 100 CAPSULE, LIQUID FILLED ORAL at 08:08

## 2021-08-21 RX ADMIN — PRENATAL VIT W/ FE FUMARATE-FA TAB 27-0.8 MG 1 TABLET: 27-0.8 TAB at 08:08

## 2021-08-21 RX ADMIN — PRAMOXINE HYDROCHLORIDE HYDROCORTISONE ACETATE 1 APPLICATOR: 100; 100 AEROSOL, FOAM TOPICAL at 10:08

## 2022-01-18 LAB
HPV APTIMA: NEGATIVE
PAP RECOMMENDATION EXT: NORMAL

## 2022-01-20 LAB — PAP RECOMMENDATION EXT: NORMAL

## 2022-01-24 ENCOUNTER — TELEPHONE (OUTPATIENT)
Dept: PRIMARY CARE CLINIC | Facility: CLINIC | Age: 37
End: 2022-01-24
Payer: COMMERCIAL

## 2022-01-24 NOTE — TELEPHONE ENCOUNTER
----- Message from Rhona Ag sent at 1/24/2022  8:34 AM CST -----  Contact: [phone]  Doctor appointment and lab have been scheduled.  Please link lab orders to the lab appointment.  Date of doctor appointment: 3/15/22  Date of lab appointment:  3/10/22  Physical or F/U: physical

## 2022-03-15 ENCOUNTER — PATIENT OUTREACH (OUTPATIENT)
Dept: ADMINISTRATIVE | Facility: HOSPITAL | Age: 37
End: 2022-03-15
Payer: COMMERCIAL

## 2022-03-15 ENCOUNTER — PATIENT MESSAGE (OUTPATIENT)
Dept: PRIMARY CARE CLINIC | Facility: CLINIC | Age: 37
End: 2022-03-15

## 2022-03-15 ENCOUNTER — OFFICE VISIT (OUTPATIENT)
Dept: PRIMARY CARE CLINIC | Facility: CLINIC | Age: 37
End: 2022-03-15
Payer: COMMERCIAL

## 2022-03-15 VITALS
RESPIRATION RATE: 18 BRPM | WEIGHT: 94.44 LBS | HEIGHT: 62 IN | OXYGEN SATURATION: 99 % | SYSTOLIC BLOOD PRESSURE: 136 MMHG | BODY MASS INDEX: 17.38 KG/M2 | HEART RATE: 97 BPM | DIASTOLIC BLOOD PRESSURE: 84 MMHG

## 2022-03-15 DIAGNOSIS — Z13.6 ENCOUNTER FOR SCREENING FOR CARDIOVASCULAR DISORDERS: ICD-10-CM

## 2022-03-15 DIAGNOSIS — Z76.89 ENCOUNTER TO ESTABLISH CARE WITH NEW DOCTOR: ICD-10-CM

## 2022-03-15 DIAGNOSIS — Z11.59 NEED FOR HEPATITIS C SCREENING TEST: ICD-10-CM

## 2022-03-15 DIAGNOSIS — Z86.32 HISTORY OF GESTATIONAL DIABETES: ICD-10-CM

## 2022-03-15 DIAGNOSIS — I10 ESSENTIAL HYPERTENSION, BENIGN: Primary | ICD-10-CM

## 2022-03-15 PROBLEM — O36.5990 IUGR (INTRAUTERINE GROWTH RESTRICTION) AFFECTING CARE OF MOTHER: Status: RESOLVED | Noted: 2021-08-11 | Resolved: 2022-03-15

## 2022-03-15 PROBLEM — O09.529 AMA (ADVANCED MATERNAL AGE) MULTIGRAVIDA 35+: Status: RESOLVED | Noted: 2021-08-02 | Resolved: 2022-03-15

## 2022-03-15 PROBLEM — O36.5990 IUGR, ANTENATAL: Status: RESOLVED | Noted: 2021-08-10 | Resolved: 2022-03-15

## 2022-03-15 PROCEDURE — 4010F PR ACE/ARB THEARPY RXD/TAKEN: ICD-10-PCS | Mod: CPTII,S$GLB,, | Performed by: FAMILY MEDICINE

## 2022-03-15 PROCEDURE — 3079F DIAST BP 80-89 MM HG: CPT | Mod: CPTII,S$GLB,, | Performed by: FAMILY MEDICINE

## 2022-03-15 PROCEDURE — 1159F PR MEDICATION LIST DOCUMENTED IN MEDICAL RECORD: ICD-10-PCS | Mod: CPTII,S$GLB,, | Performed by: FAMILY MEDICINE

## 2022-03-15 PROCEDURE — 1160F PR REVIEW ALL MEDS BY PRESCRIBER/CLIN PHARMACIST DOCUMENTED: ICD-10-PCS | Mod: CPTII,S$GLB,, | Performed by: FAMILY MEDICINE

## 2022-03-15 PROCEDURE — 3008F PR BODY MASS INDEX (BMI) DOCUMENTED: ICD-10-PCS | Mod: CPTII,S$GLB,, | Performed by: FAMILY MEDICINE

## 2022-03-15 PROCEDURE — 93005 EKG 12-LEAD: ICD-10-PCS | Mod: S$GLB,,, | Performed by: FAMILY MEDICINE

## 2022-03-15 PROCEDURE — 3079F PR MOST RECENT DIASTOLIC BLOOD PRESSURE 80-89 MM HG: ICD-10-PCS | Mod: CPTII,S$GLB,, | Performed by: FAMILY MEDICINE

## 2022-03-15 PROCEDURE — 93005 ELECTROCARDIOGRAM TRACING: CPT | Mod: S$GLB,,, | Performed by: FAMILY MEDICINE

## 2022-03-15 PROCEDURE — 1160F RVW MEDS BY RX/DR IN RCRD: CPT | Mod: CPTII,S$GLB,, | Performed by: FAMILY MEDICINE

## 2022-03-15 PROCEDURE — 99204 PR OFFICE/OUTPT VISIT, NEW, LEVL IV, 45-59 MIN: ICD-10-PCS | Mod: S$GLB,,, | Performed by: FAMILY MEDICINE

## 2022-03-15 PROCEDURE — 3075F SYST BP GE 130 - 139MM HG: CPT | Mod: CPTII,S$GLB,, | Performed by: FAMILY MEDICINE

## 2022-03-15 PROCEDURE — 3075F PR MOST RECENT SYSTOLIC BLOOD PRESS GE 130-139MM HG: ICD-10-PCS | Mod: CPTII,S$GLB,, | Performed by: FAMILY MEDICINE

## 2022-03-15 PROCEDURE — 1159F MED LIST DOCD IN RCRD: CPT | Mod: CPTII,S$GLB,, | Performed by: FAMILY MEDICINE

## 2022-03-15 PROCEDURE — 3008F BODY MASS INDEX DOCD: CPT | Mod: CPTII,S$GLB,, | Performed by: FAMILY MEDICINE

## 2022-03-15 PROCEDURE — 99999 PR PBB SHADOW E&M-EST. PATIENT-LVL III: ICD-10-PCS | Mod: PBBFAC,,, | Performed by: FAMILY MEDICINE

## 2022-03-15 PROCEDURE — 99204 OFFICE O/P NEW MOD 45 MIN: CPT | Mod: S$GLB,,, | Performed by: FAMILY MEDICINE

## 2022-03-15 PROCEDURE — 99999 PR PBB SHADOW E&M-EST. PATIENT-LVL III: CPT | Mod: PBBFAC,,, | Performed by: FAMILY MEDICINE

## 2022-03-15 PROCEDURE — 93010 EKG 12-LEAD: ICD-10-PCS | Mod: S$GLB,,, | Performed by: INTERNAL MEDICINE

## 2022-03-15 PROCEDURE — 4010F ACE/ARB THERAPY RXD/TAKEN: CPT | Mod: CPTII,S$GLB,, | Performed by: FAMILY MEDICINE

## 2022-03-15 PROCEDURE — 93010 ELECTROCARDIOGRAM REPORT: CPT | Mod: S$GLB,,, | Performed by: INTERNAL MEDICINE

## 2022-03-15 RX ORDER — MEDROXYPROGESTERONE ACETATE 150 MG/ML
150 INJECTION, SUSPENSION INTRAMUSCULAR
COMMUNITY
End: 2022-03-25

## 2022-03-15 RX ORDER — ENALAPRIL MALEATE 5 MG/1
5 TABLET ORAL DAILY
COMMUNITY
Start: 2022-03-07 | End: 2022-03-25

## 2022-03-15 NOTE — PROGRESS NOTES
"Subjective:       Patient ID: Ce Ag is a 36 y.o. female.    Chief Complaint: Establish Care and Annual Exam    Here to establish care. , developed gestational HTN and GDM (diet-controlled) during recent pregnancy, delivered . BP remained elevated for several months post-partum, 160s/100s, so recently started on enalapril this past weekend by her OB. Prior to recent pregnancy, she was on no meds. Since starting enalapril, BP has been 130s/70s. Not planning on having any more kids, on Depo. Says she is feeling fine.     Review of Systems   Constitutional: Negative for chills, fatigue and fever.   HENT: Negative for congestion and trouble swallowing.    Eyes: Negative for visual disturbance.   Respiratory: Negative for cough and shortness of breath.    Cardiovascular: Negative for chest pain.   Gastrointestinal: Negative for abdominal pain, blood in stool, nausea and vomiting.   Endocrine: Negative for polydipsia and polyuria.   Genitourinary: Negative for difficulty urinating.   Musculoskeletal: Negative for arthralgias.   Skin: Negative for rash.   Allergic/Immunologic: Negative for immunocompromised state.   Neurological: Negative for dizziness, light-headedness and headaches.   Hematological: Does not bruise/bleed easily.   Psychiatric/Behavioral: Negative for agitation, confusion and sleep disturbance.       Objective:      Vitals:    03/15/22 1345 03/15/22 1414   BP: (!) 144/84 136/84   BP Location: Right arm Right arm   Patient Position: Sitting Sitting   BP Method: Small (Manual) Small (Manual)   Pulse: 97    Resp: 18    SpO2: 99%    Weight: 42.8 kg (94 lb 7.5 oz)    Height: 5' 2" (1.575 m)      Physical Exam  Vitals and nursing note reviewed.   Constitutional:       Appearance: She is well-developed.   HENT:      Head: Normocephalic and atraumatic.      Mouth/Throat:      Mouth: Mucous membranes are moist.      Pharynx: Oropharynx is clear.   Eyes:      Pupils: Pupils are equal, round, " and reactive to light.   Neck:      Vascular: No carotid bruit or JVD.   Cardiovascular:      Rate and Rhythm: Normal rate and regular rhythm.      Pulses:           Radial pulses are 2+ on the right side and 2+ on the left side.      Heart sounds: Normal heart sounds.   Pulmonary:      Effort: Pulmonary effort is normal.      Breath sounds: Normal breath sounds.   Abdominal:      General: Bowel sounds are normal. There is no distension.      Palpations: Abdomen is soft.      Tenderness: There is no abdominal tenderness.   Musculoskeletal:      Cervical back: Neck supple.      Right lower leg: No edema.      Left lower leg: No edema.   Skin:     General: Skin is warm and dry.   Neurological:      Mental Status: She is alert and oriented to person, place, and time.   Psychiatric:         Behavior: Behavior normal.         Lab Results   Component Value Date    WBC 12.93 (H) 08/20/2021    HGB 10.0 (L) 08/20/2021    HCT 30.3 (L) 08/20/2021     08/20/2021    ALT 21 08/19/2021    AST 21 08/19/2021     (L) 08/19/2021    K 4.0 08/19/2021     08/19/2021    CREATININE 0.7 08/19/2021    BUN 21 (H) 08/19/2021    CO2 19 (L) 08/19/2021      Assessment:       1. Essential hypertension, benign    2. History of gestational diabetes    3. Encounter for screening for cardiovascular disorders    4. Need for hepatitis C screening test    5. Encounter to establish care with new doctor        Plan:       Essential hypertension, benign  -     EKG 12-lead  -     CBC Auto Differential; Future; Expected date: 03/15/2022  -     Comprehensive Metabolic Panel; Future; Expected date: 03/15/2022  -     TSH; Future; Expected date: 03/15/2022  EKG normal.  Continue current dose of enalapril.  Advised to monitor blood pressure twice daily for the next few weeks.  Will message for updated readings in a few weeks.  Adjust meds if needed.  Needs baseline labs  History of gestational diabetes  -     Hemoglobin A1C; Future; Expected  date: 03/15/2022    Encounter for screening for cardiovascular disorders  -     Lipid Panel; Future; Expected date: 03/15/2022    Need for hepatitis C screening test  -     Hepatitis C Antibody; Future; Expected date: 03/15/2022    Encounter to establish care with new doctor      Medication List with Changes/Refills   Current Medications    ENALAPRIL (VASOTEC) 5 MG TABLET    Take 5 mg by mouth once daily.    MEDROXYPROGESTERONE (DEPO-PROVERA) 150 MG/ML INJECTION    Inject 150 mg into the muscle every 3 (three) months.   Discontinued Medications    BENZOCAINE-LANOLIN (DERMOPLAST) 20-0.5 % AERO    Apply topically continuous prn.    IBUPROFEN (ADVIL,MOTRIN) 600 MG TABLET    Take 1 tablet (600 mg total) by mouth every 6 (six) hours as needed (cramping).    LABETALOL (NORMODYNE) 200 MG TABLET    Take 200 mg by mouth 2 (two) times daily.    PRENATAL VIT/IRON FUM/FOLIC AC (PRENATAL 1+1 ORAL)    Take 1 tablet by mouth.

## 2022-03-24 ENCOUNTER — HOSPITAL ENCOUNTER (INPATIENT)
Facility: HOSPITAL | Age: 37
LOS: 1 days | Discharge: HOME | DRG: 055 | End: 2022-03-25
Attending: EMERGENCY MEDICINE | Admitting: PSYCHIATRY & NEUROLOGY
Payer: COMMERCIAL

## 2022-03-24 ENCOUNTER — NURSE TRIAGE (OUTPATIENT)
Dept: ADMINISTRATIVE | Facility: CLINIC | Age: 37
End: 2022-03-24
Payer: COMMERCIAL

## 2022-03-24 ENCOUNTER — PATIENT MESSAGE (OUTPATIENT)
Dept: PRIMARY CARE CLINIC | Facility: CLINIC | Age: 37
End: 2022-03-24
Payer: COMMERCIAL

## 2022-03-24 DIAGNOSIS — R93.0 ABNORMAL CT SCAN OF HEAD: ICD-10-CM

## 2022-03-24 DIAGNOSIS — R51.9 NONINTRACTABLE HEADACHE, UNSPECIFIED CHRONICITY PATTERN, UNSPECIFIED HEADACHE TYPE: Primary | ICD-10-CM

## 2022-03-24 PROBLEM — S06.5XAA SDH (SUBDURAL HEMATOMA): Status: RESOLVED | Noted: 2022-03-24 | Resolved: 2022-03-24

## 2022-03-24 PROBLEM — S06.5XAA SDH (SUBDURAL HEMATOMA): Status: ACTIVE | Noted: 2022-03-24

## 2022-03-24 PROBLEM — R90.89 ABNORMAL BRAIN CT: Status: ACTIVE | Noted: 2022-03-24

## 2022-03-24 LAB — TROPONIN I SERPL DL<=0.01 NG/ML-MCNC: <0.006 NG/ML (ref 0–0.03)

## 2022-03-24 PROCEDURE — 96361 HYDRATE IV INFUSION ADD-ON: CPT

## 2022-03-24 PROCEDURE — 99223 PR INITIAL HOSPITAL CARE,LEVL III: ICD-10-PCS | Mod: ,,, | Performed by: PSYCHIATRY & NEUROLOGY

## 2022-03-24 PROCEDURE — 63600175 PHARM REV CODE 636 W HCPCS: Performed by: PHYSICIAN ASSISTANT

## 2022-03-24 PROCEDURE — 99223 1ST HOSP IP/OBS HIGH 75: CPT | Mod: ,,, | Performed by: PSYCHIATRY & NEUROLOGY

## 2022-03-24 PROCEDURE — 99285 EMERGENCY DEPT VISIT HI MDM: CPT | Mod: CS,,, | Performed by: PHYSICIAN ASSISTANT

## 2022-03-24 PROCEDURE — 99223 PR INITIAL HOSPITAL CARE,LEVL III: ICD-10-PCS | Mod: ,,, | Performed by: NURSE PRACTITIONER

## 2022-03-24 PROCEDURE — 99285 EMERGENCY DEPT VISIT HI MDM: CPT | Mod: 25

## 2022-03-24 PROCEDURE — U0002 COVID-19 LAB TEST NON-CDC: HCPCS | Performed by: NURSE PRACTITIONER

## 2022-03-24 PROCEDURE — 84484 ASSAY OF TROPONIN QUANT: CPT | Mod: 91 | Performed by: PHYSICIAN ASSISTANT

## 2022-03-24 PROCEDURE — 96374 THER/PROPH/DIAG INJ IV PUSH: CPT

## 2022-03-24 PROCEDURE — 99285 PR EMERGENCY DEPT VISIT,LEVEL V: ICD-10-PCS | Mod: CS,,, | Performed by: PHYSICIAN ASSISTANT

## 2022-03-24 PROCEDURE — 25000003 PHARM REV CODE 250: Performed by: PHYSICIAN ASSISTANT

## 2022-03-24 PROCEDURE — 99223 1ST HOSP IP/OBS HIGH 75: CPT | Mod: ,,, | Performed by: NURSE PRACTITIONER

## 2022-03-24 PROCEDURE — 96360 HYDRATION IV INFUSION INIT: CPT

## 2022-03-24 PROCEDURE — 12000002 HC ACUTE/MED SURGE SEMI-PRIVATE ROOM

## 2022-03-24 RX ORDER — NICARDIPINE HYDROCHLORIDE 0.2 MG/ML
0-15 INJECTION INTRAVENOUS CONTINUOUS
Status: DISCONTINUED | OUTPATIENT
Start: 2022-03-24 | End: 2022-03-25

## 2022-03-24 RX ORDER — METOCLOPRAMIDE HYDROCHLORIDE 5 MG/ML
10 INJECTION INTRAMUSCULAR; INTRAVENOUS
Status: COMPLETED | OUTPATIENT
Start: 2022-03-24 | End: 2022-03-24

## 2022-03-24 RX ADMIN — SODIUM CHLORIDE 1000 ML: 0.9 INJECTION, SOLUTION INTRAVENOUS at 04:03

## 2022-03-24 RX ADMIN — METOCLOPRAMIDE 10 MG: 5 INJECTION, SOLUTION INTRAMUSCULAR; INTRAVENOUS at 04:03

## 2022-03-24 NOTE — CONSULTS
[facility] - Emergency Dept  Neurosurgery  Consult Note    Subjective:     History of Present Illness: 36 F PMHx HTN presenting from OSH with sudden onset severe headache and chest pain  and blurry vision starting earlier today, lasting for 1 hour, with gradual improvement afterwards. Blurry vision has resolved. Pt currently reports some persistent throbbing, but is otherwise resting comfortably. She denies LOC, seizures, nausea/vomiting, or focal weakness.     CTH/CTA negative for SAH or aneurysm/vascular malformation. Tiny falcine hyperdensity seen, possibly SDH, stable on rpt scan.       Post-Op Info:  * No surgery found *         Past Medical History:   Diagnosis Date    Diabetes mellitus     gestational    Hypertension     gestational        Past Surgical History:   Procedure Laterality Date    OVARIAN CYST DRAINAGE  2004       Social History     Socioeconomic History    Marital status: Single   Tobacco Use    Smoking status: Never Smoker    Smokeless tobacco: Never Used   Substance and Sexual Activity    Alcohol use: Yes     Comment: socially    Drug use: Never    Sexual activity: Yes     Partners: Male       Family History   Problem Relation Age of Onset    No Known Problems Mother     Hypertension Father     Colon cancer Father     Throat cancer Father     Hypertension Brother        Review of patient's allergies indicates:  No Known Allergies      Medications:  Continuous Infusions:   niCARdipine Stopped (03/24/22 1515)     Scheduled Meds:  PRN Meds:     Review of Systems   Constitutional:  Negative for fatigue and fever.   Respiratory:  Negative for shortness of breath.    Cardiovascular:  Negative for chest pain.   Gastrointestinal:  Negative for nausea and vomiting.   Genitourinary:  Negative for difficulty urinating.   Musculoskeletal: Negative for back pain and neck pain.   Neurological:  headaches. Negative for dizziness, seizures, syncope, weakness and numbness.     Objective:      Weight: 42.6 kg (94 lb)  Body mass index is 17.19 kg/m².  Vital Signs (Most Recent):  Temp: 98.4 °F (36.9 °C) (03/24/22 1449)  Pulse: 86 (03/24/22 1747)  Resp: 16 (03/24/22 1449)  BP: 124/65 (03/24/22 1747)  SpO2: 100 % (03/24/22 1747) Vital Signs (24h Range):  Temp:  [97.7 °F (36.5 °C)-98.4 °F (36.9 °C)] 98.4 °F (36.9 °C)  Pulse:  [] 86  Resp:  [16] 16  SpO2:  [80 %-100 %] 100 %  BP: (119-174)/(61-81) 124/65                      Physical Exam:    Constitutional: No distress.     HEENT: atraumatic/normocephalic    Cardiovascular: Regular rhythm.     Pulm: aerating well, saturating well    Abdominal: Soft.     Psych/Behavior: He is alert.     E4V5M6  AOx3  PERRL  EOMI  Face Symmetric  Tongue midline  BUE 5/5  BLE 5/5  No drift      Significant Labs:  Recent Labs   Lab 03/24/22  1257         K 4.0      CO2 19*   BUN 20   CREATININE 0.8   CALCIUM 10.1     Recent Labs   Lab 03/24/22  1204   WBC 10.24   HGB 13.3   HCT 39.3        Recent Labs   Lab 03/24/22  1257   INR 1.0   APTT 24.9     Microbiology Results (last 7 days)       ** No results found for the last 168 hours. **          All pertinent labs from the last 24 hours have been reviewed.    Significant Diagnostics:  I have reviewed and interpreted all pertinent imaging results/findings within the past 24 hours.    Assessment/Plan:     SDH (subdural hematoma)  36 F PMHx HTN presenting from OSH with sudden onset severe headache and chest pain  and blurry vision starting earlier today, lasting for 1 hour, which have since resolved.     CTH/CTA negative for SAH or aneurysm/vascular malformation. Tiny falcine hyperdensity seen, possibly SDH, stable on rpt scan.     -- No further imaging needed  -- Very low risk of SAH given near resolution of symptoms and lack of imaging findings.   -- Symptoms likely migraine related  -- No need for neurosurgical admission  -- NSGY will sign off at this time  -- dispo per ED.         Nereyda Canales,  MD  Neurosurgery  [facility] - Emergency Dept

## 2022-03-24 NOTE — ASSESSMENT & PLAN NOTE
36 F PMHx HTN presenting from OSH with sudden onset severe headache and chest pain  and blurry vision starting earlier today, lasting for 1 hour, which have since resolved.     CTH/CTA negative for SAH or aneurysm/vascular malformation. Tiny falcine hyperdensity seen, possibly SDH, stable on rpt scan.     -- No further imaging needed  -- Very low risk of SAH given near resolution of symptoms and lack of imaging findings.   -- Symptoms likely migraine related  -- No need for neurosurgical admission  -- NSGY will sign off at this time  -- dispo per ED.

## 2022-03-24 NOTE — DISCHARGE INSTRUCTIONS
Follow-up in neurology clinic and with your primary doctor for re-evaluation of your headaches and abnormal CT scan.    Return to the ED immediately for any new or significantly worsening symptoms such as worsening headache, uncontrolled vomiting, confusion, fevers or any other worrisome symptoms.    --Patient stable for discharge to home    --Please take prescriptions as detailed in medication list    --All questions/concerns addressed and answered    --Please followup with neurosurgery clinic in 1 year with repeat MRI; to be arranged by Neurosurgery Clinic     --Please call immediately for any new onset nausea/vomiting/fever/chills, wound breakdown, numbness/tingling/weakness

## 2022-03-24 NOTE — HPI
36 F PMHx HTN presenting from OSH with sudden onset severe headache and chest pain  and blurry vision starting earlier today, lasting for 1 hour, with gradual improvement afterwards. Blurry vision has resolved. Pt currently reports some persistent throbbing, but is otherwise resting comfortably. She denies LOC, seizures, nausea/vomiting, or focal weakness.     CTH/CTA negative for SAH or aneurysm/vascular malformation. Tiny falcine hyperdensity seen, possibly SDH, stable on rpt scan.

## 2022-03-24 NOTE — ED TRIAGE NOTES
"Ce Ag, a 36 y.o. female presents to the ED w/ complaint of transfer. Pt from [place] for neuro consult for newly dc'ed subdural hematoma. States earlier today began to have "the worst headache," had blurry vision, and felt that something  "wasn't right." Reported to ED. Hx of hypertension that began w/ pregnancy; gave birth 7 ago. Not currently taking BP meds. Denies SOB, chest pain, oriented x 4.     Triage note:  Chief Complaint   Patient presents with    Transfer     Pt transferred from [facility] for eval of subdural hematoma.   Pt with headache and CP that started today.  Pt coming to ED for neuro eval.  Pt Neuro intact.       Review of patient's allergies indicates:  No Known Allergies  Past Medical History:   Diagnosis Date    Diabetes mellitus     gestational    Hypertension     gestational        "

## 2022-03-24 NOTE — SUBJECTIVE & OBJECTIVE
Past Medical History:   Diagnosis Date    Diabetes mellitus     gestational    Hypertension     gestational        Past Surgical History:   Procedure Laterality Date    OVARIAN CYST DRAINAGE  2004       Social History     Socioeconomic History    Marital status: Single   Tobacco Use    Smoking status: Never Smoker    Smokeless tobacco: Never Used   Substance and Sexual Activity    Alcohol use: Yes     Comment: socially    Drug use: Never    Sexual activity: Yes     Partners: Male       Family History   Problem Relation Age of Onset    No Known Problems Mother     Hypertension Father     Colon cancer Father     Throat cancer Father     Hypertension Brother        Review of patient's allergies indicates:  No Known Allergies      Medications:  Continuous Infusions:   niCARdipine Stopped (03/24/22 1515)     Scheduled Meds:  PRN Meds:     Review of Systems   Constitutional:  Negative for fatigue and fever.   Respiratory:  Negative for shortness of breath.    Cardiovascular:  Negative for chest pain.   Gastrointestinal:  Negative for nausea and vomiting.   Genitourinary:  Negative for difficulty urinating.   Musculoskeletal: Negative for back pain and neck pain.   Neurological:  headaches. Negative for dizziness, seizures, syncope, weakness and numbness.     Objective:     Weight: 42.6 kg (94 lb)  Body mass index is 17.19 kg/m².  Vital Signs (Most Recent):  Temp: 98.4 °F (36.9 °C) (03/24/22 1449)  Pulse: 86 (03/24/22 1747)  Resp: 16 (03/24/22 1449)  BP: 124/65 (03/24/22 1747)  SpO2: 100 % (03/24/22 1747) Vital Signs (24h Range):  Temp:  [97.7 °F (36.5 °C)-98.4 °F (36.9 °C)] 98.4 °F (36.9 °C)  Pulse:  [] 86  Resp:  [16] 16  SpO2:  [80 %-100 %] 100 %  BP: (119-174)/(61-81) 124/65                      Physical Exam:    Constitutional: No distress.     HEENT: atraumatic/normocephalic    Cardiovascular: Regular rhythm.     Pulm: aerating well, saturating well    Abdominal: Soft.     Psych/Behavior: He is alert.      E4V5M6  AOx3  PERRL  EOMI  Face Symmetric  Tongue midline  BUE 5/5  BLE 5/5  No drift      Significant Labs:  Recent Labs   Lab 03/24/22  1257         K 4.0      CO2 19*   BUN 20   CREATININE 0.8   CALCIUM 10.1     Recent Labs   Lab 03/24/22  1204   WBC 10.24   HGB 13.3   HCT 39.3        Recent Labs   Lab 03/24/22  1257   INR 1.0   APTT 24.9     Microbiology Results (last 7 days)       ** No results found for the last 168 hours. **          All pertinent labs from the last 24 hours have been reviewed.    Significant Diagnostics:  I have reviewed and interpreted all pertinent imaging results/findings within the past 24 hours.

## 2022-03-24 NOTE — TELEPHONE ENCOUNTER
Pt started feeling bad this am. She felt strange when she bent down. Pt took her bp medication this am and she is feeling weird. Pt felt like her heart was beating fast and she was exhausted. Pt is on the 8th day of her period. Bp 140/100. Chest pain and blurred vision. Care advice recommend pt go to Er. Pt verbalized understanding.     Reason for Disposition   Systolic BP >= 160 OR Diastolic >= 100, and any cardiac or neurologic symptoms (e.g., chest pain, difficulty breathing, unsteady gait, blurred vision)    Additional Information   Negative: Sounds like a life-threatening emergency to the triager   Negative: Pregnant 20 or more weeks or postpartum (< 6 weeks after delivery) with new hand or face swelling   Negative: Pregnant 20 or more weeks or postpartum with Systolic BP >= 140 OR Diastolic >= 90    Protocols used: BLOOD PRESSURE - HIGH-A-OH

## 2022-03-24 NOTE — ED PROVIDER NOTES
Encounter Date: 3/24/2022       History     Chief Complaint   Patient presents with    Transfer     Pt transferred from [facility] for eval of subdural hematoma.   Pt with headache and CP that started today.  Pt coming to ED for neuro eval.  Pt Neuro intact.       36 y.o. female with medical history of hypertension and gestational diabetes presents to the ED as a transfer for nontraumatic SAH.  Patient reports a frontal headache that she thought was a sinus headache this morning.  She took Sudafed.  At that time, patient also began to have chest pain.  She currently denies chest pain.  Patient was found to have a small She does to reported dull frontal headache.  She denies nausea, vomiting, weakness, numbness.          Review of patient's allergies indicates:  No Known Allergies  Past Medical History:   Diagnosis Date    Diabetes mellitus     gestational    Hypertension     gestational      Past Surgical History:   Procedure Laterality Date    OVARIAN CYST DRAINAGE  2004     Family History   Problem Relation Age of Onset    No Known Problems Mother     Hypertension Father     Colon cancer Father     Throat cancer Father     Hypertension Brother      Social History     Tobacco Use    Smoking status: Never Smoker    Smokeless tobacco: Never Used   Substance Use Topics    Alcohol use: Yes     Comment: socially    Drug use: Never     Review of Systems   Constitutional: Negative for fever.   HENT: Negative for sore throat.    Eyes: Negative for photophobia.   Respiratory: Negative for shortness of breath.    Cardiovascular: Negative for chest pain.   Gastrointestinal: Negative for nausea and vomiting.   Genitourinary: Negative for dysuria.   Musculoskeletal: Negative for back pain.   Skin: Negative for rash.   Neurological: Positive for headaches. Negative for weakness.   Hematological: Does not bruise/bleed easily.       Physical Exam     Initial Vitals [03/24/22 1449]   BP Pulse Resp Temp SpO2    134/70 88 16 98.4 °F (36.9 °C) 100 %      MAP       --         Physical Exam    Nursing note and vitals reviewed.  Constitutional: She appears well-developed and well-nourished. She is not diaphoretic.  Non-toxic appearance. She does not appear ill. No distress.   HENT:   Head: Normocephalic and atraumatic.   Eyes: Conjunctivae and EOM are normal. Pupils are equal, round, and reactive to light.   Neck: Neck supple.   Cardiovascular: Normal rate and regular rhythm. Exam reveals no gallop and no friction rub.    No murmur heard.  Pulmonary/Chest: Effort normal and breath sounds normal. No accessory muscle usage. No tachypnea. No respiratory distress. She has no decreased breath sounds. She has no wheezes. She has no rhonchi. She has no rales.   Abdominal: She exhibits no distension.   Musculoskeletal:      Cervical back: Neck supple.     Neurological: She is alert.   Skin: No rash noted.   Psychiatric: She has a normal mood and affect. Her behavior is normal.         ED Course   Procedures  Labs Reviewed   TROPONIN I   SARS-COV-2 RDRP GENE          Imaging Results           MRI Brain Without Contrast (Final result)  Result time 03/24/22 20:36:55    Final result by Lexx Duarte MD (03/24/22 20:36:55)                 Impression:      Small focus of T1 isointense/T2 hypointense signal associated with susceptibility artifact along the anterior paramedian frontal lobe, in keeping with a small focus of acute hemorrhage as seen on CT and CTA same day.  No additional hemorrhage or major vascular distribution infarct identified.    This report was flagged in Epic as abnormal.    Electronically signed by resident: Tarun Bryant  Date:    03/24/2022  Time:    19:19    Electronically signed by: Lexx Duarte MD  Date:    03/24/2022  Time:    20:36             Narrative:    EXAMINATION:  MRI BRAIN WITHOUT CONTRAST    CLINICAL HISTORY:  Stroke, follow up;==;.    TECHNIQUE:  Multiplanar multisequence MR imaging of the brain was  performed without contrast.    COMPARISON:  CTA head 03/24/2022, CT head 03/24/2010    FINDINGS:  Intracranial compartment:    Ventricles and sulci are normal in size for age without evidence of hydrocephalus.    There is a small focus of T1 isointense/T2 hypointense signal associated with susceptibility artifact (axial series 9, image 24; series 11, image 15) along the anterior paramedian frontal lobe.  Location is similar to that of CT and CTA same day.    Normal vascular flow voids are preserved.    Skull/extracranial contents (limited evaluation): Bone marrow signal intensity is normal.                                 Medications   niCARdipine 40 mg/200 mL (0.2 mg/mL) infusion (0 mg/hr Intravenous Hold 3/24/22 1515)   metoclopramide HCl injection 10 mg (10 mg Intravenous Given 3/24/22 1605)   sodium chloride 0.9% bolus 1,000 mL (0 mLs Intravenous Stopped 3/24/22 1756)     Medical Decision Making:   History:   Old Medical Records: I decided to obtain old medical records.  Initial Assessment:   36-year-old female presents to the ED as a transfer for for neurosurgical evaluation of suspected nontraumatic subarachnoid hemorrhage.  Hemodynamically stable.  Neuro intact.  Afebrile.  Differential Diagnosis:   My differential diagnosis includes but is not limited to:  Subarachnoid hemorrhage, cerebral aneurysm, cerebral calcification, subdural hematoma  Clinical Tests:   Lab Tests: Ordered  ED Management:  A serial troponin was obtained as patient complained of chest pain as well as headache with onset earlier today.  Second troponin was negative.    After evaluation by Neurosurgery, there was concern that findings on CT were not consistent with bleed.  Ultimately, an MRI was obtained by neuro critical care service.  Findings on MRI concerning for bleed.  Patient admitted to Neuro Critical Care for further management.  After Reglan, fluids in a short period of sleep, patient reports significant improvement in her  headache.    I have reviewed the patient's records and discussed this case with my supervising physician.         Other:   I have discussed this case with another health care provider.       <> Summary of the Discussion: Neurosurgery, neuro critical care                      Clinical Impression:   Final diagnoses:  [R51.9] Nonintractable headache, unspecified chronicity pattern, unspecified headache type (Primary)  [R93.0] Abnormal CT scan of head          ED Disposition Condition    Admit               Ashwini Sneed PA-C  03/24/22 0560

## 2022-03-25 VITALS
DIASTOLIC BLOOD PRESSURE: 72 MMHG | HEART RATE: 84 BPM | OXYGEN SATURATION: 99 % | RESPIRATION RATE: 20 BRPM | TEMPERATURE: 98 F | HEIGHT: 62 IN | SYSTOLIC BLOOD PRESSURE: 152 MMHG | BODY MASS INDEX: 17.31 KG/M2 | WEIGHT: 94.06 LBS

## 2022-03-25 PROBLEM — I67.841 REVERSIBLE CEREBROVASCULAR VASOCONSTRICTION SYNDROME: Status: ACTIVE | Noted: 2022-03-25

## 2022-03-25 PROBLEM — I61.9 NONTRAUMATIC INTRACEREBRAL HEMORRHAGE: Status: ACTIVE | Noted: 2022-03-25

## 2022-03-25 PROBLEM — D32.9 MENINGIOMA: Status: ACTIVE | Noted: 2022-03-25

## 2022-03-25 LAB
ABO + RH BLD: NORMAL
ALBUMIN SERPL BCP-MCNC: 3.6 G/DL (ref 3.5–5.2)
ALP SERPL-CCNC: 38 U/L (ref 55–135)
ALT SERPL W/O P-5'-P-CCNC: 14 U/L (ref 10–44)
ANION GAP SERPL CALC-SCNC: 7 MMOL/L (ref 8–16)
APTT BLDCRRT: 25.1 SEC (ref 21–32)
AST SERPL-CCNC: 16 U/L (ref 10–40)
BASOPHILS # BLD AUTO: 0.01 K/UL (ref 0–0.2)
BASOPHILS NFR BLD: 0.2 % (ref 0–1.9)
BILIRUB SERPL-MCNC: 0.5 MG/DL (ref 0.1–1)
BLD GP AB SCN CELLS X3 SERPL QL: NORMAL
BUN SERPL-MCNC: 9 MG/DL (ref 6–20)
CALCIUM SERPL-MCNC: 8.8 MG/DL (ref 8.7–10.5)
CHLORIDE SERPL-SCNC: 111 MMOL/L (ref 95–110)
CO2 SERPL-SCNC: 21 MMOL/L (ref 23–29)
CREAT SERPL-MCNC: 0.7 MG/DL (ref 0.5–1.4)
CTP QC/QA: YES
DIFFERENTIAL METHOD: ABNORMAL
EOSINOPHIL # BLD AUTO: 0.1 K/UL (ref 0–0.5)
EOSINOPHIL NFR BLD: 1.5 % (ref 0–8)
ERYTHROCYTE [DISTWIDTH] IN BLOOD BY AUTOMATED COUNT: 12 % (ref 11.5–14.5)
EST. GFR  (AFRICAN AMERICAN): >60 ML/MIN/1.73 M^2
EST. GFR  (NON AFRICAN AMERICAN): >60 ML/MIN/1.73 M^2
ESTIMATED AVG GLUCOSE: 100 MG/DL (ref 68–131)
GLUCOSE SERPL-MCNC: 85 MG/DL (ref 70–110)
HBA1C MFR BLD: 5.1 % (ref 4–5.6)
HCT VFR BLD AUTO: 31.6 % (ref 37–48.5)
HGB BLD-MCNC: 10.8 G/DL (ref 12–16)
IMM GRANULOCYTES # BLD AUTO: 0.01 K/UL (ref 0–0.04)
IMM GRANULOCYTES NFR BLD AUTO: 0.2 % (ref 0–0.5)
INR PPP: 1 (ref 0.8–1.2)
LYMPHOCYTES # BLD AUTO: 1.9 K/UL (ref 1–4.8)
LYMPHOCYTES NFR BLD: 35.4 % (ref 18–48)
MAGNESIUM SERPL-MCNC: 2.1 MG/DL (ref 1.6–2.6)
MCH RBC QN AUTO: 30.9 PG (ref 27–31)
MCHC RBC AUTO-ENTMCNC: 34.2 G/DL (ref 32–36)
MCV RBC AUTO: 91 FL (ref 82–98)
MONOCYTES # BLD AUTO: 0.4 K/UL (ref 0.3–1)
MONOCYTES NFR BLD: 7.5 % (ref 4–15)
NEUTROPHILS # BLD AUTO: 3 K/UL (ref 1.8–7.7)
NEUTROPHILS NFR BLD: 55.2 % (ref 38–73)
NRBC BLD-RTO: 0 /100 WBC
PHOSPHATE SERPL-MCNC: 2.5 MG/DL (ref 2.7–4.5)
PLATELET # BLD AUTO: 244 K/UL (ref 150–450)
PMV BLD AUTO: 9.9 FL (ref 9.2–12.9)
POTASSIUM SERPL-SCNC: 3.9 MMOL/L (ref 3.5–5.1)
PROT SERPL-MCNC: 5.8 G/DL (ref 6–8.4)
PROTHROMBIN TIME: 10.9 SEC (ref 9–12.5)
RBC # BLD AUTO: 3.49 M/UL (ref 4–5.4)
SARS-COV-2 RDRP RESP QL NAA+PROBE: NEGATIVE
SODIUM SERPL-SCNC: 139 MMOL/L (ref 136–145)
TSH SERPL DL<=0.005 MIU/L-ACNC: 1.03 UIU/ML (ref 0.4–4)
WBC # BLD AUTO: 5.48 K/UL (ref 3.9–12.7)

## 2022-03-25 PROCEDURE — 85610 PROTHROMBIN TIME: CPT | Performed by: NURSE PRACTITIONER

## 2022-03-25 PROCEDURE — 99223 1ST HOSP IP/OBS HIGH 75: CPT | Mod: ,,, | Performed by: NEUROLOGICAL SURGERY

## 2022-03-25 PROCEDURE — 25500020 PHARM REV CODE 255: Performed by: PSYCHIATRY & NEUROLOGY

## 2022-03-25 PROCEDURE — 83735 ASSAY OF MAGNESIUM: CPT | Performed by: NURSE PRACTITIONER

## 2022-03-25 PROCEDURE — 85730 THROMBOPLASTIN TIME PARTIAL: CPT | Performed by: NURSE PRACTITIONER

## 2022-03-25 PROCEDURE — 84443 ASSAY THYROID STIM HORMONE: CPT | Performed by: NURSE PRACTITIONER

## 2022-03-25 PROCEDURE — 25000003 PHARM REV CODE 250: Performed by: NURSE PRACTITIONER

## 2022-03-25 PROCEDURE — A9585 GADOBUTROL INJECTION: HCPCS | Performed by: PSYCHIATRY & NEUROLOGY

## 2022-03-25 PROCEDURE — 99233 SBSQ HOSP IP/OBS HIGH 50: CPT | Mod: ,,, | Performed by: PSYCHIATRY & NEUROLOGY

## 2022-03-25 PROCEDURE — 84100 ASSAY OF PHOSPHORUS: CPT | Performed by: NURSE PRACTITIONER

## 2022-03-25 PROCEDURE — 99223 PR INITIAL HOSPITAL CARE,LEVL III: ICD-10-PCS | Mod: ,,, | Performed by: NEUROLOGICAL SURGERY

## 2022-03-25 PROCEDURE — 99233 PR SUBSEQUENT HOSPITAL CARE,LEVL III: ICD-10-PCS | Mod: ,,, | Performed by: PSYCHIATRY & NEUROLOGY

## 2022-03-25 PROCEDURE — 86850 RBC ANTIBODY SCREEN: CPT | Performed by: NURSE PRACTITIONER

## 2022-03-25 PROCEDURE — 83036 HEMOGLOBIN GLYCOSYLATED A1C: CPT | Performed by: NURSE PRACTITIONER

## 2022-03-25 PROCEDURE — 85025 COMPLETE CBC W/AUTO DIFF WBC: CPT | Performed by: NURSE PRACTITIONER

## 2022-03-25 PROCEDURE — 25000003 PHARM REV CODE 250: Performed by: STUDENT IN AN ORGANIZED HEALTH CARE EDUCATION/TRAINING PROGRAM

## 2022-03-25 PROCEDURE — 80053 COMPREHEN METABOLIC PANEL: CPT | Performed by: NURSE PRACTITIONER

## 2022-03-25 RX ORDER — LABETALOL HCL 20 MG/4 ML
10 SYRINGE (ML) INTRAVENOUS EVERY 4 HOURS PRN
Status: DISCONTINUED | OUTPATIENT
Start: 2022-03-25 | End: 2022-03-25

## 2022-03-25 RX ORDER — GADOBUTROL 604.72 MG/ML
5 INJECTION INTRAVENOUS
Status: COMPLETED | OUTPATIENT
Start: 2022-03-25 | End: 2022-03-25

## 2022-03-25 RX ORDER — SODIUM,POTASSIUM PHOSPHATES 280-250MG
2 POWDER IN PACKET (EA) ORAL
Status: DISCONTINUED | OUTPATIENT
Start: 2022-03-25 | End: 2022-03-25

## 2022-03-25 RX ORDER — SODIUM CHLORIDE 0.9 % (FLUSH) 0.9 %
10 SYRINGE (ML) INJECTION
Status: DISCONTINUED | OUTPATIENT
Start: 2022-03-25 | End: 2022-03-25 | Stop reason: HOSPADM

## 2022-03-25 RX ORDER — ONDANSETRON 2 MG/ML
4 INJECTION INTRAMUSCULAR; INTRAVENOUS EVERY 8 HOURS PRN
Status: DISCONTINUED | OUTPATIENT
Start: 2022-03-25 | End: 2022-03-25 | Stop reason: HOSPADM

## 2022-03-25 RX ORDER — LABETALOL HCL 20 MG/4 ML
10 SYRINGE (ML) INTRAVENOUS EVERY 4 HOURS PRN
Status: DISCONTINUED | OUTPATIENT
Start: 2022-03-25 | End: 2022-03-25 | Stop reason: HOSPADM

## 2022-03-25 RX ORDER — ACETAMINOPHEN 325 MG/1
650 TABLET ORAL EVERY 6 HOURS PRN
Status: DISCONTINUED | OUTPATIENT
Start: 2022-03-25 | End: 2022-03-25 | Stop reason: HOSPADM

## 2022-03-25 RX ORDER — AMOXICILLIN 250 MG
1 CAPSULE ORAL 2 TIMES DAILY
Status: DISCONTINUED | OUTPATIENT
Start: 2022-03-25 | End: 2022-03-25 | Stop reason: HOSPADM

## 2022-03-25 RX ORDER — LANOLIN ALCOHOL/MO/W.PET/CERES
800 CREAM (GRAM) TOPICAL
Status: DISCONTINUED | OUTPATIENT
Start: 2022-03-25 | End: 2022-03-25

## 2022-03-25 RX ORDER — VERAPAMIL HYDROCHLORIDE 40 MG/1
40 TABLET ORAL 3 TIMES DAILY
Qty: 90 TABLET | Refills: 11 | Status: SHIPPED | OUTPATIENT
Start: 2022-03-25 | End: 2022-04-06

## 2022-03-25 RX ORDER — VERAPAMIL HYDROCHLORIDE 40 MG/1
40 TABLET ORAL 3 TIMES DAILY
Status: DISCONTINUED | OUTPATIENT
Start: 2022-03-25 | End: 2022-03-25 | Stop reason: HOSPADM

## 2022-03-25 RX ORDER — SODIUM CHLORIDE 9 MG/ML
INJECTION, SOLUTION INTRAVENOUS CONTINUOUS
Status: DISCONTINUED | OUTPATIENT
Start: 2022-03-25 | End: 2022-03-25 | Stop reason: HOSPADM

## 2022-03-25 RX ADMIN — SODIUM CHLORIDE: 0.9 INJECTION, SOLUTION INTRAVENOUS at 12:03

## 2022-03-25 RX ADMIN — GADOBUTROL 5 ML: 604.72 INJECTION INTRAVENOUS at 04:03

## 2022-03-25 RX ADMIN — LABETALOL HYDROCHLORIDE 10 MG: 5 INJECTION, SOLUTION INTRAVENOUS at 12:03

## 2022-03-25 RX ADMIN — VERAPAMIL HYDROCHLORIDE 40 MG: 40 TABLET ORAL at 05:03

## 2022-03-25 NOTE — ASSESSMENT & PLAN NOTE
36F with history of HTN transferred from OSH for evaluation of small hyperintensity seen on CT (SAH vs calcification) after presenting for evaluation of HA and blurred vision, now resolved.    Q1 hour neurocheck while in ICU  Small area hemorrhage, verfied by MRI  Coags WNL  SBP <160m holding home enalapril  Start Verapamil 40mg TID  Concern for potential RCVS, angiogram pending  Vas Neuro and NSGY consulted   Plan for step down to Vascular Neurology

## 2022-03-25 NOTE — PLAN OF CARE
[facility] - Neuro Critical Care  Initial Discharge Assessment       Primary Care Provider: Ron Oseguera MD    Admission Diagnosis: Abnormal CT scan of head [R93.0]  Nonintractable headache, unspecified chronicity pattern, unspecified headache type [R51.9]    Admission Date: 3/24/2022  Expected Discharge Date: 3/28/2022    Discharge Barriers Identified: None    Payor: BLUE CROSS BLUE SHIELD / Plan: BCBS ALL OUT OF STATE / Product Type: PPO /     Extended Emergency Contact Information  Primary Emergency Contact: Afua Ag  Mobile Phone: [phone]  Relation: Mother    Discharge Plan A: Home with family  Discharge Plan B: [id number] [address] [facility] [address] [place] [address]  Phone: [phone] Fax: [phone]      Transferred from:  Home    Past Medical History:   Diagnosis Date    Diabetes mellitus     gestational    Hypertension     gestational          CM met with patient and Afua Ag (mother) [phone] in room for Discharge Planning Assessment.  Patient is able to answer questions.  Per patient, she lives with her , Jorge Luis Morillo and 2 minor children in a single story house with 4 step(s) to enter and B/L rails.   Per patient, she was independent with ADLS and driving,  and used no dme for ambulation.  Patient will have assistance from her  and mother upon discharge.   Discharge Planning Booklet given to patient/family and discussed.  All questions addressed.  CM will follow for needs.    Initial Assessment (most recent)     Adult Discharge Assessment - 03/25/22 1400        Discharge Assessment    Assessment Type Discharge Planning Assessment     Confirmed/corrected address, phone number and insurance Yes     Confirmed Demographics Correct on Facesheet     Source of Information patient     If unable to respond/provide information was family/caregiver contacted? Yes      Communicated ALCIRA with patient/caregiver Yes     Reason For Admission Abnormal Brain CT     Lives With spouse;child(roni), dependent     Facility Arrived From: Home     Do you expect to return to your current living situation? Yes     Do you have help at home or someone to help you manage your care at home? Yes     Who are your caregiver(s) and their phone number(s)? Afua gA (mother) [phone]     Prior to hospitilization cognitive status: Alert/Oriented     Current cognitive status: Alert/Oriented     Walking or Climbing Stairs Difficulty none     Dressing/Bathing Difficulty none     Home Accessibility stairs to enter home     Number of Stairs, Main Entrance four     Stair Railings, Main Entrance railings on both sides of stairs     Home Layout Able to live on 1st floor     Equipment Currently Used at Home none     Readmission within 30 days? No     Patient currently being followed by outpatient case management? No     Do you currently have service(s) that help you manage your care at home? No     Do you take prescription medications? Yes     Do you have prescription coverage? Yes     Coverage [employer]/gamesGRAB[employer]     Do you have any problems affording any of your prescribed medications? No     Is the patient taking medications as prescribed? yes     Who is going to help you get home at discharge? Afua Ag (mother) [phone]     How do you get to doctors appointments? family or friend will provide;car, drives self     Are you on dialysis? No     Do you take coumadin? No     Discharge Plan A Home with family     Discharge Plan B Home Health     DME Needed Upon Discharge  none     Discharge Plan discussed with: Patient;Parent(s)     Name(s) and Number(s) Afua Ag (mother) [phone] (at bedside)     Discharge Barriers Identified None        Relationship/Environment    Name(s) of Who Lives With Patient Seferino Morillo (husbnd)                    Jasmine Serra RN, CCRN-K,  [facility]  Neuro-Critical Care   [phone]

## 2022-03-25 NOTE — CONSULTS
[facility] - Emergency Dept  Vascular Neurology  Comprehensive Stroke Center  Consult Note    Consults  Assessment/Plan:     Patient is a 36 y.o. year old female with:    * Abnormal brain CT  Ce Ag is a 36 y.o. female with a significant medical history of HTN who presents to the hospital as a transfer for evaluation of abnormal CTH concerning for SAH vs calcification.  Repeat brain imaging with no change in findings.  Initially the patient had HA and blurred vision which are now resolved.  NIHSS 0.    -Consider cerebral angiogram as DDx includes RCVS  -SBP<160  -Neurosurgery was consulted and signed off.    Essential hypertension, benign  -Stroke risk factor peer SBP as high as 170s.  Currently controlled.  -SBP<160      STROKE DOCUMENTATION          NIH Scale:  Interval: baseline  1a. Level of Consciousness: 0-->Alert, keenly responsive  1b. LOC Questions: 0-->Answers both questions correctly  1c. LOC Commands: 0-->Performs both tasks correctly  2. Best Gaze: 0-->Normal  3. Visual: 0-->No visual loss  4. Facial Palsy: 0-->Normal symmetrical movements  5a. Motor Arm, Left: 0-->No drift, limb holds 90 (or 45) degrees for full 10 secs  5b. Motor Arm, Right: 0-->No drift, limb holds 90 (or 45) degrees for full 10 secs  6a. Motor Leg, Left: 0-->No drift, leg holds 30 degree position for full 5 secs  6b. Motor Leg, Right: 0-->No drift, leg holds 30 degree position for full 5 secs  7. Limb Ataxia: 0-->Absent  8. Sensory: 0-->Normal, no sensory loss  9. Best Language: 0-->No aphasia, normal  10. Dysarthria: 0-->Normal  11. Extinction and Inattention (formerly Neglect): 0-->No abnormality  Total (NIH Stroke Scale): 0    Modified Blanquita Score: 0  Francisco Javier Coma Scale:15   ABCD2 Score:    [id number] Score:   HAS -BLED Score:   ICH Score:0  Hunt & Guerra Classification:Grade I      Thrombolysis Candidate? No, Suspicion of subarachnoid hemorrhage , CT findings (ICH, SAH, Large core infarct)     Delays to Thrombolysis?   Not Applicable    Interventional Revascularization Candidate?   Is the patient eligible for mechanical endovascular reperfusion (RD)?  No; No large vessel occlusion identified on imaging     Delays to Thrombectomy? Not Applicable    Hemorrhagic change of an Ischemic Stroke: Does this patient have an ischemic stroke with hemorrhagic changes? No     Subjective:     History of Present Illness:  Ce Ag is a 36 y.o. female with a significant medical history of HTN who presents to the hospital as a transfer for evaluation of abnormal CTH concerning for SAH vs calcification.  The patient states she was in her usual state of health when she woke up this morning and noted acute onset frontal HA with blurred vision, CP, and nausea.  [facility] where she was given Tylenol with minimal resolution of her symptoms.  A CTH was obtained and revealed a small frontal extra-axial hyperintensity w/concern for SAH vs calcification.  A CTA brain was obtained and revealed similar findings as the CTH.  The patient was transferred to [facility] for evaluation by Neurosurgery.  Currently the patient is awake and oriented x 4. She currently denies HA, dizziness, CP, N/V, weakness, or numbness.  NIHSS 0. The patient denies any difficulty ambulating though she was not witnessed ambulating on this exam.  MRI brain was obtained and is stable w/similar findings as previous imaging.  The patient is admitted to [facility].                Past Medical History:   Diagnosis Date    Diabetes mellitus     gestational    Hypertension     gestational      Past Surgical History:   Procedure Laterality Date    OVARIAN CYST DRAINAGE  2004     Family History   Problem Relation Age of Onset    No Known Problems Mother     Hypertension Father     Colon cancer Father     Throat cancer Father     Hypertension Brother      Social History     Tobacco Use    Smoking status: Never Smoker    Smokeless tobacco: Never Used    Substance Use Topics    Alcohol use: Yes     Comment: socially    Drug use: Never     Review of patient's allergies indicates:  No Known Allergies    Medications: I have reviewed the current medication administration record.    Current Outpatient Medications   Medication Instructions    enalapril (VASOTEC) 5 mg, Oral, Daily    medroxyPROGESTERone (DEPO-PROVERA) 150 mg, Intramuscular, Every 3 months       Review of Systems   Constitutional:  Negative for chills, fatigue and fever.   HENT:  Negative for drooling and trouble swallowing.    Eyes:  Positive for visual disturbance (blurred vison, currently resolved). Negative for photophobia, pain and discharge.   Respiratory:  Negative for cough, chest tightness, shortness of breath and wheezing.    Cardiovascular:  Negative for chest pain, palpitations and leg swelling.   Gastrointestinal:  Positive for nausea (resolved). Negative for abdominal pain, constipation, diarrhea and vomiting.   Endocrine: Negative for cold intolerance and heat intolerance.   Genitourinary:  Negative for dysuria, frequency, hematuria and urgency.   Musculoskeletal:  Negative for gait problem, neck pain and neck stiffness.   Skin:  Negative for rash and wound.   Allergic/Immunologic: Negative for environmental allergies and food allergies.   Neurological:  Positive for headaches (currently resolved). Negative for dizziness, tremors, speech difficulty, weakness, light-headedness and numbness.   Hematological:  Negative for adenopathy. Does not bruise/bleed easily.   Psychiatric/Behavioral:  Negative for agitation, confusion and hallucinations.    Objective:     Vital Signs (Most Recent):  Temp: 98.4 °F (36.9 °C) (03/24/22 1449)  Pulse: 87 (03/24/22 1847)  Resp: 16 (03/24/22 1449)  BP: 116/71 (03/24/22 1847)  SpO2: 100 % (03/24/22 1847)    Vital Signs Range (Last 24H):  Temp:  [97.7 °F (36.5 °C)-98.4 °F (36.9 °C)]   Pulse:  []   Resp:  [16]   BP: (116-174)/(61-81)   SpO2:  [80 %-100  %]     Physical Exam  Vitals and nursing note reviewed.   Constitutional:       General: She is not in acute distress.     Appearance: She is well-developed. She is not diaphoretic.   HENT:      Head: Normocephalic and atraumatic.      Right Ear: External ear normal.      Left Ear: External ear normal.      Nose: Nose normal.      Mouth/Throat:      Mouth: Mucous membranes are moist.   Eyes:      General: No scleral icterus.        Right eye: No discharge.         Left eye: No discharge.      Extraocular Movements: Extraocular movements intact.      Conjunctiva/sclera: Conjunctivae normal.      Pupils: Pupils are equal, round, and reactive to light.   Cardiovascular:      Rate and Rhythm: Normal rate and regular rhythm.   Pulmonary:      Effort: Pulmonary effort is normal. No respiratory distress.   Abdominal:      General: Bowel sounds are normal. There is no distension.      Palpations: Abdomen is soft.      Tenderness: There is no abdominal tenderness.   Musculoskeletal:      Cervical back: Normal range of motion and neck supple.      Right lower leg: No edema.      Left lower leg: No edema.   Skin:     General: Skin is warm and dry.      Capillary Refill: Capillary refill takes less than 2 seconds.   Neurological:      General: No focal deficit present.      Mental Status: She is alert and oriented to person, place, and time.      Sensory: No sensory deficit.      Motor: No weakness.   Psychiatric:         Mood and Affect: Mood normal.         Behavior: Behavior normal.       Neurological Exam:   LOC: alert  Attention Span: Good   Language: No aphasia  Articulation: No dysarthria  Orientation: Person, Place, Time   Visual Fields: Full  EOM (CN III, IV, VI): Full/intact  Facial Movement (CN VII): Symmetric facial expression    Motor: Arm left  Normal 5/5  Leg left  Normal 5/5  Arm right  Normal 5/5  Leg right Normal 5/5  Cerebellum: No evidence of appendicular or axial ataxia  Sensation: Intact to light touch,  temperature and vibration      Laboratory:  CMP:   Recent Labs   Lab 03/24/22  1257   CALCIUM 10.1   ALBUMIN 4.7   PROT 7.5      K 4.0   CO2 19*      BUN 20   CREATININE 0.8   ALKPHOS 48*   ALT 19   AST 19   BILITOT 0.4     CBC:   Recent Labs   Lab 03/24/22  1204   WBC 10.24   RBC 4.22   HGB 13.3   HCT 39.3      MCV 93   MCH 31.5*   MCHC 33.8     Lipid Panel: No results for input(s): CHOL, LDLCALC, HDL, TRIG in the last 168 hours.  Coagulation:   Recent Labs   Lab 03/24/22  1257   INR 1.0   APTT 24.9     Hgb A1C: No results for input(s): HGBA1C in the last 168 hours.  TSH: No results for input(s): TSH in the last 168 hours.    Diagnostic Results:      Brain imaging:  CTH 3/24/2022 Impression:  1. Small volume frontal extra-axial, favor subarachnoid, hemorrhage without significant mass effect.  In the absence of history of trauma, differential considerations would include but not limited to reversible cerebral vasoconstriction syndrome (RCVS), vasculitis, venous thrombosis, vasoactive medication use.  Mycotic aneurysm additionally considered.  Further characterization with CTA/CTV versus MRA/MRV and possibly brain MRI would be recommended for further characterization.  2. No evidence of intracranial mass effect or midline shift.  No findings to suggest acute transcortical infarct by noncontrast CT.      MRI Brain 3/24/2022 Impression:  Small focus of T1 isointense/T2 hypointense signal associated with susceptibility artifact along the anterior paramedian frontal lobe, in keeping with a small focus of acute hemorrhage as seen on CT and CTA same day.  No additional hemorrhage or major vascular distribution infarct identified.    Vessel Imaging:  CTA Head 3/24/2022 Impression:  Unremarkable CTA of the head.  Stable focus of extra-axial hemorrhage along the right aspect of the anterior falx.  No associated abnormal enhancement.  Additional evaluation, as clinically warranted.    Cardiac Evaluation:    EKG 3/24/2022 Sinus tachycardia        Marcela Camara, JESS, NP  [facility]  Department of Vascular Neurology   [facility] - Emergency Dept     This medical record was prepared using voice recognition software and may contain phonetic and/or or grammatical errors.  Garbled syntax, mangled pronounciations, and other bizarre constructions may be attributed to that system.

## 2022-03-25 NOTE — PROGRESS NOTES
[facility] Neuro Critical Care  Neurocritical Care  Progress Note    Admit Date: 3/24/2022  Service Date: 03/25/2022  Length of Stay: 1    Subjective:     Chief Complaint: Abnormal brain CT    History of Present Illness: Ce Ag is a 36 year old female with a medical history significant for HTN who presented to OSH on 3/24/2022 for evaluation and management of a severe frontal headache with associated blurred vision and chest pain x1 hour. Per chart, patient was given 1000mg of tylenol with resolution of symptoms. CTH at OSH showed a small frontal extra-axial hyperintensity with concern for SAH vs calcification. CTA unremarkable for aneurysm/vascular malformation. Patient was transferred to [facility] for [place]surgical evaluation. On arrival, patient was neuro intact and without acute neurologic deficit, headache or vision change.     MRI Brain to further eval potential hemorrhage, results confirm small hemorrhage. Admitted to r/o [facility].       Hospital Course: 03/25/2022 MRI overnight with small anterior paramedian frontal lobe hemorrhage. Concern for RCVS, started on Verapamil. Remains asymptomatic, NIH 0. Step down to vascular neurology.      Interval History: See hospital course     Review of Systems   Constitutional:  Negative for activity change, chills and fatigue.   HENT:  Negative for sinus pressure, sinus pain and trouble swallowing.    Eyes:  Negative for photophobia and visual disturbance.   Respiratory:  Negative for chest tightness and shortness of breath.    Cardiovascular:  Negative for chest pain and palpitations.   Gastrointestinal:  Negative for diarrhea, nausea and vomiting.   Genitourinary:  Negative for difficulty urinating and dyspareunia.   Musculoskeletal:  Negative for neck pain and neck stiffness.   Skin:  Negative for pallor, rash and wound.   Neurological:  Negative for dizziness, tremors, seizures, syncope, facial asymmetry, speech difficulty, weakness, light-headedness, numbness and  headaches.   Psychiatric/Behavioral:  Negative for agitation and confusion.      Objective:     Vitals:    Temp: 98.4 °F (36.9 °C)  Pulse: 86  BP: (!) 149/75  MAP (mmHg): 108  Resp: (!) 24  SpO2: 98 %  Oxygen Concentration (%): 98  O2 Device (Oxygen Therapy): room air    Temp  Min: 98.4 °F (36.9 °C)  Max: 98.7 °F (37.1 °C)  Pulse  Min: 77  Max: 120  BP  Min: 115/70  Max: 174/81  MAP (mmHg)  Min: 88  Max: 117  Resp  Min: 16  Max: 24  SpO2  Min: 80 %  Max: 100 %  Oxygen Concentration (%)  Min: 98  Max: 100    No intake/output data recorded.           Physical Exam  Constitutional:       Appearance: Normal appearance.   HENT:      Head: Normocephalic and atraumatic.      Nose: Nose normal.      Mouth/Throat:      Mouth: Mucous membranes are moist.      Pharynx: Oropharynx is clear.   Cardiovascular:      Rate and Rhythm: Normal rate and regular rhythm.      Heart sounds: Normal heart sounds.   Abdominal:      General: Bowel sounds are normal.      Palpations: Abdomen is soft.   Musculoskeletal:      Cervical back: Normal range of motion.   Skin:     General: Skin is warm and dry.   Neurological:      Mental Status: She is alert.      Comments:   E4 V5 M6  A&Ox4  PERRLA, EOMi  L end gaze nystagmus   STILES, follows commands  Strength 5/5 throughout   Sensation intact      Today I personally reviewed pertinent medications, lines/drains/airways, imaging, cardiology results, laboratory results, microbiology results, notably:    3/24/2022 MRI Brain WO            Assessment/Plan:     Neuro  * Abnormal brain CT  See Nontraumatic intracerebral hemorrhage    Reversible cerebrovascular vasoconstriction syndrome  See Nontraumatic intracerebral hemorrhage    Nontraumatic intracerebral hemorrhage  36F with history of HTN transferred from OSH for evaluation of small hyperintensity seen on CT (SAH vs calcification) after presenting for evaluation of HA and blurred vision, now resolved.    Q1 hour neurocheck while in ICU  Small area  hemorrhage, verfied by MRI  Coags WNL  SBP <160m holding home enalapril  Start Verapamil 40mg TID  Concern for potential RCVS, angiogram pending  [facility] Neuro and NSGY consulted   Plan for step down to Vascular Neurology      Cardiac/Vascular  Essential hypertension, benign  Hold Enalapril  Will start verapamil 40mg TID  EKG          The patient is being Prophylaxed for:  Venous Thromboembolism with: Chemical  Stress Ulcer with: Not Applicable   Ventilator Pneumonia with: not applicable    Activity Orders          Turn patient starting at 03/25 0200    Diet NPO: NPO starting at 03/25 0013    Elevate HOB starting at 03/25 0003        Full Code    Jorge Luis Ruffin MD  Neurocritical Care  [facility] - Neuro Critical Care

## 2022-03-25 NOTE — ASSESSMENT & PLAN NOTE
-Stroke risk factor peer SBP as high as 170s.  Currently controlled.  -SBP<160   How Severe Is Your Skin Lesion?: moderate Have Your Skin Lesions Been Treated?: not been treated Is This A New Presentation, Or A Follow-Up?: Skin Lesions

## 2022-03-25 NOTE — SUBJECTIVE & OBJECTIVE
Interval History: See hospital course     Review of Systems   Constitutional:  Negative for activity change, chills and fatigue.   HENT:  Negative for sinus pressure, sinus pain and trouble swallowing.    Eyes:  Negative for photophobia and visual disturbance.   Respiratory:  Negative for chest tightness and shortness of breath.    Cardiovascular:  Negative for chest pain and palpitations.   Gastrointestinal:  Negative for diarrhea, nausea and vomiting.   Genitourinary:  Negative for difficulty urinating and dyspareunia.   Musculoskeletal:  Negative for neck pain and neck stiffness.   Skin:  Negative for pallor, rash and wound.   Neurological:  Negative for dizziness, tremors, seizures, syncope, facial asymmetry, speech difficulty, weakness, light-headedness, numbness and headaches.   Psychiatric/Behavioral:  Negative for agitation and confusion.      Objective:     Vitals:    Temp: 98.4 °F (36.9 °C)  Pulse: 86  BP: (!) 149/75  MAP (mmHg): 108  Resp: (!) 24  SpO2: 98 %  Oxygen Concentration (%): 98  O2 Device (Oxygen Therapy): room air    Temp  Min: 98.4 °F (36.9 °C)  Max: 98.7 °F (37.1 °C)  Pulse  Min: 77  Max: 120  BP  Min: 115/70  Max: 174/81  MAP (mmHg)  Min: 88  Max: 117  Resp  Min: 16  Max: 24  SpO2  Min: 80 %  Max: 100 %  Oxygen Concentration (%)  Min: 98  Max: 100    No intake/output data recorded.           Physical Exam  Constitutional:       Appearance: Normal appearance.   HENT:      Head: Normocephalic and atraumatic.      Nose: Nose normal.      Mouth/Throat:      Mouth: Mucous membranes are moist.      Pharynx: Oropharynx is clear.   Cardiovascular:      Rate and Rhythm: Normal rate and regular rhythm.      Heart sounds: Normal heart sounds.   Abdominal:      General: Bowel sounds are normal.      Palpations: Abdomen is soft.   Musculoskeletal:      Cervical back: Normal range of motion.   Skin:     General: Skin is warm and dry.   Neurological:      Mental Status: She is alert.      Comments:   E4 V5  M6  A&Ox4  PERRLA, EOMi  L end gaze nystagmus   STILES, follows commands  Strength 5/5 throughout   Sensation intact      Today I personally reviewed pertinent medications, lines/drains/airways, imaging, cardiology results, laboratory results, microbiology results, notably:    3/24/2022 MRI Brain WO

## 2022-03-25 NOTE — SUBJECTIVE & OBJECTIVE
Past Medical History:   Diagnosis Date    Diabetes mellitus     gestational    Hypertension     gestational      Past Surgical History:   Procedure Laterality Date    OVARIAN CYST DRAINAGE  2004      Current Facility-Administered Medications on File Prior to Encounter   Medication Dose Route Frequency Provider Last Rate Last Admin    [COMPLETED] acetaminophen tablet 1,000 mg  1,000 mg Oral ED 1 Time Ryne Mcghee MD   1,000 mg at 03/24/22 1304    [COMPLETED] iohexoL (OMNIPAQUE 350) injection 100 mL  100 mL Intravenous ONCE PRN Ryne Mcghee MD   100 mL at 03/24/22 1358    [COMPLETED] labetaloL injection 10 mg  10 mg Intravenous ED 1 Time Ryne Mcghee MD   10 mg at 03/24/22 1305    [COMPLETED] labetaloL injection 10 mg  10 mg Intravenous ED 1 Time Ryne Mcghee MD   10 mg at 03/24/22 1329    [COMPLETED] levETIRAcetam in NaCl (iso-os) IVPB 500 mg  500 mg Intravenous ED 1 Time Ryne Mcghee  mL/hr at 03/24/22 1304 500 mg at 03/24/22 1304    [DISCONTINUED] niCARdipine 40 mg/200 mL (0.2 mg/mL) infusion  2.5 mg/hr Intravenous Continuous Ryne Mcghee MD 12.5 mL/hr at 03/24/22 1345 2.5 mg/hr at 03/24/22 1345     Current Outpatient Medications on File Prior to Encounter   Medication Sig Dispense Refill    enalapril (VASOTEC) 5 MG tablet Take 5 mg by mouth once daily.      medroxyPROGESTERone (DEPO-PROVERA) 150 mg/mL injection Inject 150 mg into the muscle every 3 (three) months.        Allergies: Patient has no known allergies.    Family History   Problem Relation Age of Onset    No Known Problems Mother     Hypertension Father     Colon cancer Father     Throat cancer Father     Hypertension Brother      Social History     Tobacco Use    Smoking status: Never Smoker    Smokeless tobacco: Never Used   Substance Use Topics    Alcohol use: Yes     Comment: socially    Drug use: Never     Review of Systems   Constitutional:  Negative for fatigue.   Objective:     Vitals:    Temp: 98.4 °F (36.9  °C)  Pulse: 87  BP: 116/71  MAP (mmHg): 89  Resp: 16  SpO2: 100 %  O2 Device (Oxygen Therapy): room air    Temp  Min: 97.7 °F (36.5 °C)  Max: 98.4 °F (36.9 °C)  Pulse  Min: 81  Max: 120  BP  Min: 116/71  Max: 174/81  MAP (mmHg)  Min: 88  Max: 117  Resp  Min: 16  Max: 16  SpO2  Min: 80 %  Max: 100 %    No intake/output data recorded.           Physical Exam  Constitutional:       Appearance: Normal appearance.   HENT:      Head: Normocephalic and atraumatic.      Nose: Nose normal.      Mouth/Throat:      Mouth: Mucous membranes are moist.      Pharynx: Oropharynx is clear.   Cardiovascular:      Rate and Rhythm: Normal rate and regular rhythm.      Heart sounds: Normal heart sounds.   Abdominal:      General: Bowel sounds are normal.      Palpations: Abdomen is soft.   Musculoskeletal:      Cervical back: Normal range of motion.   Skin:     General: Skin is warm and dry.      Capillary Refill: Capillary refill takes 2 to 3 seconds.   Neurological:      Mental Status: She is alert.      Comments: E4 V5 M6  Alert, O x 4, converse  LINSEY SHERMAN, follows commands  Trength 5/5 throughout   Sensation intact      Today I personally reviewed pertinent medications, lines/drains/airways, imaging, cardiology results, laboratory results, microbiology results, notably:

## 2022-03-25 NOTE — HPI
Ce Ag is a 36 y.o. female with a significant medical history of HTN who presents to the hospital as a transfer for evaluation of abnormal CTH concerning for SAH vs calcification.  The patient states she was in her usual state of health when she woke up this morning and noted acute onset frontal HA with blurred vision, CP, and nausea.  She presented to the OSH ED where she was given Tylenol with minimal resolution of her symptoms.  A CTH was obtained and revealed a small frontal extra-axial hyperintensity w/concern for SAH vs calcification.  A CTA brain was obtained and revealed similar findings as the CTH.  The patient was transferred to [facility] for evaluation by Neurosurgery.  Currently the patient is awake and oriented x 4. She currently denies HA, dizziness, CP, N/V, weakness, or numbness.  NIHSS 0. The patient denies any difficulty ambulating though she was not witnessed ambulating on this exam.  MRI brain was obtained and is stable w/similar findings as previous imaging.  The patient is admitted to [facility].

## 2022-03-25 NOTE — SUBJECTIVE & OBJECTIVE
Past Medical History:   Diagnosis Date    Diabetes mellitus     gestational    Hypertension     gestational      Past Surgical History:   Procedure Laterality Date    OVARIAN CYST DRAINAGE  2004     Family History   Problem Relation Age of Onset    No Known Problems Mother     Hypertension Father     Colon cancer Father     Throat cancer Father     Hypertension Brother      Social History     Tobacco Use    Smoking status: Never Smoker    Smokeless tobacco: Never Used   Substance Use Topics    Alcohol use: Yes     Comment: socially    Drug use: Never     Review of patient's allergies indicates:  No Known Allergies    Medications: I have reviewed the current medication administration record.    Current Outpatient Medications   Medication Instructions    enalapril (VASOTEC) 5 mg, Oral, Daily    medroxyPROGESTERone (DEPO-PROVERA) 150 mg, Intramuscular, Every 3 months       Review of Systems   Constitutional:  Negative for chills, fatigue and fever.   HENT:  Negative for drooling and trouble swallowing.    Eyes:  Positive for visual disturbance (blurred vison, currently resolved). Negative for photophobia, pain and discharge.   Respiratory:  Negative for cough, chest tightness, shortness of breath and wheezing.    Cardiovascular:  Negative for chest pain, palpitations and leg swelling.   Gastrointestinal:  Positive for nausea (resolved). Negative for abdominal pain, constipation, diarrhea and vomiting.   Endocrine: Negative for cold intolerance and heat intolerance.   Genitourinary:  Negative for dysuria, frequency, hematuria and urgency.   Musculoskeletal:  Negative for gait problem, neck pain and neck stiffness.   Skin:  Negative for rash and wound.   Allergic/Immunologic: Negative for environmental allergies and food allergies.   Neurological:  Positive for headaches (currently resolved). Negative for dizziness, tremors, speech difficulty, weakness, light-headedness and numbness.   Hematological:  Negative for  adenopathy. Does not bruise/bleed easily.   Psychiatric/Behavioral:  Negative for agitation, confusion and hallucinations.    Objective:     Vital Signs (Most Recent):  Temp: 98.4 °F (36.9 °C) (03/24/22 1449)  Pulse: 87 (03/24/22 1847)  Resp: 16 (03/24/22 1449)  BP: 116/71 (03/24/22 1847)  SpO2: 100 % (03/24/22 1847)    Vital Signs Range (Last 24H):  Temp:  [97.7 °F (36.5 °C)-98.4 °F (36.9 °C)]   Pulse:  []   Resp:  [16]   BP: (116-174)/(61-81)   SpO2:  [80 %-100 %]     Physical Exam  Vitals and nursing note reviewed.   Constitutional:       General: She is not in acute distress.     Appearance: She is well-developed. She is not diaphoretic.   HENT:      Head: Normocephalic and atraumatic.      Right Ear: External ear normal.      Left Ear: External ear normal.      Nose: Nose normal.      Mouth/Throat:      Mouth: Mucous membranes are moist.   Eyes:      General: No scleral icterus.        Right eye: No discharge.         Left eye: No discharge.      Extraocular Movements: Extraocular movements intact.      Conjunctiva/sclera: Conjunctivae normal.      Pupils: Pupils are equal, round, and reactive to light.   Cardiovascular:      Rate and Rhythm: Normal rate and regular rhythm.   Pulmonary:      Effort: Pulmonary effort is normal. No respiratory distress.   Abdominal:      General: Bowel sounds are normal. There is no distension.      Palpations: Abdomen is soft.      Tenderness: There is no abdominal tenderness.   Musculoskeletal:      Cervical back: Normal range of motion and neck supple.      Right lower leg: No edema.      Left lower leg: No edema.   Skin:     General: Skin is warm and dry.      Capillary Refill: Capillary refill takes less than 2 seconds.   Neurological:      General: No focal deficit present.      Mental Status: She is alert and oriented to person, place, and time.      Sensory: No sensory deficit.      Motor: No weakness.   Psychiatric:         Mood and Affect: Mood normal.          Behavior: Behavior normal.       Neurological Exam:   LOC: alert  Attention Span: Good   Language: No aphasia  Articulation: No dysarthria  Orientation: Person, Place, Time   Visual Fields: Full  EOM (CN III, IV, VI): Full/intact  Facial Movement (CN VII): Symmetric facial expression    Motor: Arm left  Normal 5/5  Leg left  Normal 5/5  Arm right  Normal 5/5  Leg right Normal 5/5  Cerebellum: No evidence of appendicular or axial ataxia  Sensation: Intact to light touch, temperature and vibration      Laboratory:  CMP:   Recent Labs   Lab 03/24/22  1257   CALCIUM 10.1   ALBUMIN 4.7   PROT 7.5      K 4.0   CO2 19*      BUN 20   CREATININE 0.8   ALKPHOS 48*   ALT 19   AST 19   BILITOT 0.4     CBC:   Recent Labs   Lab 03/24/22  1204   WBC 10.24   RBC 4.22   HGB 13.3   HCT 39.3      MCV 93   MCH 31.5*   MCHC 33.8     Lipid Panel: No results for input(s): CHOL, LDLCALC, HDL, TRIG in the last 168 hours.  Coagulation:   Recent Labs   Lab 03/24/22  1257   INR 1.0   APTT 24.9     Hgb A1C: No results for input(s): HGBA1C in the last 168 hours.  TSH: No results for input(s): TSH in the last 168 hours.    Diagnostic Results:      Brain imaging:  [facility] 3/24/2022 Impression:  1. Small volume frontal extra-axial, favor subarachnoid, hemorrhage without significant mass effect.  In the absence of history of trauma, differential considerations would include but not limited to reversible cerebral vasoconstriction syndrome (RCVS), vasculitis, venous thrombosis, vasoactive medication use.  Mycotic aneurysm additionally considered.  Further characterization with CTA/CTV versus MRA/MRV and possibly brain MRI would be recommended for further characterization.  2. No evidence of intracranial mass effect or midline shift.  No findings to suggest acute transcortical infarct by noncontrast CT.      MRI Brain 3/24/2022 Impression:  Small focus of T1 isointense/T2 hypointense signal associated with susceptibility artifact along  the anterior paramedian frontal lobe, in keeping with a small focus of acute hemorrhage as seen on CT and CTA same day.  No additional hemorrhage or major vascular distribution infarct identified.    Vessel Imaging:  CTA Head 3/24/2022 Impression:  Unremarkable CTA of the head.  Stable focus of extra-axial hemorrhage along the right aspect of the anterior falx.  No associated abnormal enhancement.  Additional evaluation, as clinically warranted.    Cardiac Evaluation:   EKG 3/24/2022 Sinus tachycardia

## 2022-03-25 NOTE — H&P
[facility] - Emergency Dept  Neurocritical Care  History & Physical    Admit Date: 3/24/2022  Service Date: 03/24/2022  Length of Stay: 1    Subjective:     Chief Complaint: Abnormal brain CT    History of Present Illness: Ce Ag is a 36 year old female with a medical history significant for HTN who presented to OSH on 3/24/2022 for evaluation and management of a severe frontal headache with associated blurred vision and chest pain x1 hour. Per chart, patient was given 1000mg of tylenol with resolution of symptoms. CTH at OSH showed a small frontal extra-axial hyperintensity with concern for SAH vs calcification. CTA unremarkable for aneurysm/vascular malformation. [place] [facility] [place] On arrival, patient was neuro intact and without acute neurologic deficit, headache or vision change.     MRI Brain to further eval potential hemorrhage, results confirm small hemorrhage. Admitted to r/o [facility].       Past Medical History:   Diagnosis Date    Diabetes mellitus     gestational    Hypertension     gestational      Past Surgical History:   Procedure Laterality Date    OVARIAN CYST DRAINAGE  2004      Current Facility-Administered Medications on File Prior to Encounter   Medication Dose Route Frequency Provider Last Rate Last Admin    [COMPLETED] acetaminophen tablet 1,000 mg  1,000 mg Oral ED 1 Time Ryne Mcghee MD   1,000 mg at 03/24/22 1304    [COMPLETED] iohexoL (OMNIPAQUE 350) injection 100 mL  100 mL Intravenous ONCE PRN Ryne Mcghee MD   100 mL at 03/24/22 1358    [COMPLETED] labetaloL injection 10 mg  10 mg Intravenous ED 1 Time Ryne Mcghee MD   10 mg at 03/24/22 1305    [COMPLETED] labetaloL injection 10 mg  10 mg Intravenous ED 1 Time Ryne Mcghee MD   10 mg at 03/24/22 1329    [COMPLETED] levETIRAcetam in NaCl (iso-os) IVPB 500 mg  500 mg Intravenous ED 1 Time Ryne Mcghee  mL/hr at 03/24/22 1304 500 mg at 03/24/22 1304     [DISCONTINUED] niCARdipine 40 mg/200 mL (0.2 mg/mL) infusion  2.5 mg/hr Intravenous Continuous Ryne Mcghee MD 12.5 mL/hr at 03/24/22 1345 2.5 mg/hr at 03/24/22 1345     Current Outpatient Medications on File Prior to Encounter   Medication Sig Dispense Refill    enalapril (VASOTEC) 5 MG tablet Take 5 mg by mouth once daily.      medroxyPROGESTERone (DEPO-PROVERA) 150 mg/mL injection Inject 150 mg into the muscle every 3 (three) months.        Allergies: Patient has no known allergies.    Family History   Problem Relation Age of Onset    No Known Problems Mother     Hypertension Father     Colon cancer Father     Throat cancer Father     Hypertension Brother      Social History     Tobacco Use    Smoking status: Never Smoker    Smokeless tobacco: Never Used   Substance Use Topics    Alcohol use: Yes     Comment: socially    Drug use: Never     Review of Systems   Constitutional:  Negative for fatigue.   Objective:     Vitals:    Temp: 98.4 °F (36.9 °C)  Pulse: 87  BP: 116/71  MAP (mmHg): 89  Resp: 16  SpO2: 100 %  O2 Device (Oxygen Therapy): room air    Temp  Min: 97.7 °F (36.5 °C)  Max: 98.4 °F (36.9 °C)  Pulse  Min: 81  Max: 120  BP  Min: 116/71  Max: 174/81  MAP (mmHg)  Min: 88  Max: 117  Resp  Min: 16  Max: 16  SpO2  Min: 80 %  Max: 100 %    No intake/output data recorded.           Physical Exam  Constitutional:       Appearance: Normal appearance.   HENT:      Head: Normocephalic and atraumatic.      Nose: Nose normal.      Mouth/Throat:      Mouth: Mucous membranes are moist.      Pharynx: Oropharynx is clear.   Cardiovascular:      Rate and Rhythm: Normal rate and regular rhythm.      Heart sounds: Normal heart sounds.   Abdominal:      General: Bowel sounds are normal.      Palpations: Abdomen is soft.   Musculoskeletal:      Cervical back: Normal range of motion.   Skin:     General: Skin is warm and dry.      Capillary Refill: Capillary refill takes 2 to 3 seconds.   Neurological:       Mental Status: She is alert.      Comments: E4 V5 M6  Alert, O x 4, converse  LINSEY SHERMAN, follows commands  Trength 5/5 throughout   Sensation intact      Today I personally reviewed pertinent medications, lines/drains/airways, imaging, cardiology results, laboratory results, microbiology results, notably:          Assessment/Plan:     Neuro  Reversible cerebrovascular vasoconstriction syndrome  Concern for RCVS given hx, and small hemophrage   Vasc Neuro following,   NPO midnight, planning for cerebral angio  Admit NCC  Hourly neuro checks, SBP < 160  PT/OT     Nontraumatic intracerebral hemorrhage  Small area hemorrhage, verfied by MRI  Coags WNL,   SBP < 160  Concern for potential RCVS  Vasc Neuro and NSGY consulted       Cardiac/Vascular  Essential hypertension, benign  Hold vasotec  Will start verapamil if HTN since Ca channel blocker can also help with spasm  EKG          The patient is being Prophylaxed for:  Venous Thromboembolism with: Mechanical  Stress Ulcer with: Not Applicable   Ventilator Pneumonia with: not applicable    Activity Orders          Turn patient starting at 03/25 0200    Diet NPO: NPO starting at 03/25 0013    Elevate HOB starting at 03/25 0003        Full Code     Level 3    I spent >35 minutes reviewing patient records, examining, and counseling the patient with greater than 50% of the time spent with direct patient care and coordination.       Yong Seay NP  Neurocritical Care  [facility] - Emergency Dept

## 2022-03-25 NOTE — CONSULTS
Inpatient consult to Physical Medicine Rehab  Consult performed by: Pat Hess NP  Consult ordered by: Yong Seay NP  Reason for consult: assess rehab needs        Reviewed patient history and current admission.  PM&R following.     JACEK Villagran, FNP-C  Physical Medicine & Rehabilitation   03/25/2022

## 2022-03-25 NOTE — NURSING
Patient discharged to home with spouse. IV's and telemetry were removed, discharge instructions were discussed with both patient and family members.

## 2022-03-25 NOTE — ASSESSMENT & PLAN NOTE
Concern for RCVS given hx, and small hemophrage   Vasc Neuro following,   NPO midnight, planning for cerebral angio  Admit NCC  Hourly neuro checks, SBP < 160  PT/OT

## 2022-03-25 NOTE — ASSESSMENT & PLAN NOTE
Small area hemorrhage, verfied by MRI  Coags WNL,   SBP < 160  Concern for potential RCVS  Vasc Neuro and NSGY consulted

## 2022-03-25 NOTE — NURSING
Patient arrived to [facility] [place] [facility] [place]    Type of stroke/diagnosis:  ICH    Current symptoms: none, all resolved    Skin assessment done: Yes    Jamila Completed? yes    Patient Belongings on Admit: phone,  has the rest    NCC notified: MD Laly

## 2022-03-25 NOTE — ASSESSMENT & PLAN NOTE
Patient is a 36 year old female with PMH of HTN who was transferred for evaluation of an abnormal CTH. Patient initially had HA and blurry vision that resolved prior to her arrival. CTH was originally thought to be SAH or ICH. She was admitted to [facility] and further work up completed for possible RCVS.  MRI W WO Contrast showed meningoma. She will be discharged home by NSGY with follow up as they see fit.     Stroke team will sign off at this time. No further recommendations or follow up with stroke indicated.

## 2022-03-25 NOTE — ASSESSMENT & PLAN NOTE
36F with history of HTN transferred from OSH for evaluation of small hyperintensity seen on CT (SAH vs calcification) after presenting for evaluation of HA and blurred vision, now resolved.    - Neuro intact  - HA resolved with 1000mg Tylenol  - Neurosurgery consulted and signed off  - MRI Brain without acute blood     Neuro ICU will sign off at this time. Please contact with any additional questions or concerns.

## 2022-03-25 NOTE — SUBJECTIVE & OBJECTIVE
Neurologic Chief Complaint: ACOSTA, SAH    Subjective:     Interval History: Patient is seen for follow-up neurological assessment and treatment recommendations: Denies HA. Okay for step down to NPU. NCC obtained MRI Brain W WO contrast, suggestive of meningioma. NSGY will discharge the patient to home. No further stroke team needs. Will sign off.      HPI, Past Medical, Family, and Social History remains the same as documented in the initial encounter.     Review of Systems   Constitutional:  Negative for chills and fever.   HENT:  Negative for drooling, hearing loss, rhinorrhea and trouble swallowing.    Eyes:  Negative for pain, redness and visual disturbance.   Respiratory:  Negative for cough, choking and shortness of breath.    Cardiovascular:  Negative for chest pain.   Gastrointestinal:  Negative for abdominal pain, diarrhea, nausea and vomiting.   Genitourinary:  Negative for difficulty urinating.   Musculoskeletal:  Negative for gait problem.   Neurological:  Negative for facial asymmetry, speech difficulty, weakness, numbness and headaches.   Psychiatric/Behavioral:  Negative for agitation, behavioral problems and confusion. The patient is not nervous/anxious.    Scheduled Meds:   senna-docusate 8.6-50 mg  1 tablet Oral BID    verapamiL  40 mg Oral TID     Continuous Infusions:   sodium chloride 0.9% 75 mL/hr at 03/25/22 0054     PRN Meds:acetaminophen, labetalol, ondansetron, sodium chloride 0.9%    Objective:     Vital Signs (Most Recent):  Temp: 97.6 °F (36.4 °C) (03/25/22 1417)  Pulse: 84 (03/25/22 1607)  Resp: 20 (03/25/22 1417)  BP: (!) 152/72 (03/25/22 1417)  SpO2: 99 % (03/25/22 1417)  BP Location: Right arm    Vital Signs Range (Last 24H):  Temp:  [97.6 °F (36.4 °C)-98.7 °F (37.1 °C)]   Pulse:  []   Resp:  [16-24]   BP: (115-152)/(70-80)   SpO2:  [98 %-100 %]   BP Location: Right arm    Physical Exam  Vitals and nursing note reviewed.   Constitutional:       General: She is not in acute  distress.     Appearance: She is not ill-appearing or diaphoretic.   HENT:      Head: Normocephalic and atraumatic.      Right Ear: External ear normal.      Left Ear: External ear normal.      Nose: No rhinorrhea.   Eyes:      General: No visual field deficit or scleral icterus.        Right eye: No discharge.         Left eye: No discharge.      Extraocular Movements: Extraocular movements intact.   Cardiovascular:      Rate and Rhythm: Normal rate.   Pulmonary:      Effort: Pulmonary effort is normal. No respiratory distress.   Abdominal:      General: There is no distension.   Musculoskeletal:         General: No deformity.   Skin:     General: Skin is warm and dry.   Neurological:      Mental Status: She is alert and oriented to person, place, and time.      Cranial Nerves: No dysarthria or facial asymmetry.      Sensory: No sensory deficit.      Motor: No weakness or pronator drift.      Coordination: Coordination normal.   Psychiatric:         Attention and Perception: Attention normal.         Mood and Affect: Mood normal.         Speech: Speech normal.         Behavior: Behavior normal. Behavior is cooperative.       Neurological Exam:   LOC: alert  Attention Span: Good   Language: No aphasia  Articulation: No dysarthria  Orientation: Person, Place, Time   Visual Fields: Full  EOM (CN III, IV, VI): Full/intact  Facial Movement (CN VII): Symmetric facial expression    Motor: Arm left  Normal 5/5  Leg left  Normal 5/5  Arm right  Normal 5/5  Leg right Normal 5/5  Sensation: intact to light touch     Laboratory:  CMP:   Recent Labs   Lab 03/25/22  0755   CALCIUM 8.8   ALBUMIN 3.6   PROT 5.8*      K 3.9   CO2 21*   *   BUN 9   CREATININE 0.7   ALKPHOS 38*   ALT 14   AST 16   BILITOT 0.5     BMP:   Recent Labs   Lab 03/25/22  0755      K 3.9   *   CO2 21*   BUN 9   CREATININE 0.7   CALCIUM 8.8     CBC:   Recent Labs   Lab 03/25/22  0755   WBC 5.48   RBC 3.49*   HGB 10.8*   HCT 31.6*   PLT  244   MCV 91   MCH 30.9   MCHC 34.2     Lipid Panel: No results for input(s): CHOL, LDLCALC, HDL, TRIG in the last 168 hours.  Coagulation:   Recent Labs   Lab 03/25/22  0755   INR 1.0   APTT 25.1     Platelet Aggregation Study: No results for input(s): PLTAGG, PLTAGINTERP, PLTAGREGLACO, ADPPLTAGGREG in the last 168 hours.  Hgb A1C:   Recent Labs   Lab 03/25/22  0052   HGBA1C 5.1     TSH:   Recent Labs   Lab 03/25/22  0052   TSH 1.034       Diagnostic Results     Brain Imaging   MRI W WO Contrast 3/25/22   Homogeneously enhancing oval focus along the anterior paramedian frontal lobe with close association of the associated dural lining.  Imaging findings favor a small meningioma, noting a focal region of hemorrhage is felt less likely given enhancement characteristics.    MRI Brain WO Contrast 3/24/22  Small focus of T1 isointense/T2 hypointense signal associated with susceptibility artifact along the anterior paramedian frontal lobe, in keeping with a small focus of acute hemorrhage as seen on CT and CTA same day.  No additional hemorrhage or major vascular distribution infarct identified    CTH 3/24/22     1. Small volume frontal extra-axial, favor subarachnoid, hemorrhage without significant mass effect.  In the absence of history of trauma, differential considerations would include but not limited to reversible cerebral vasoconstriction syndrome (RCVS), vasculitis, venous thrombosis, vasoactive medication use.  Mycotic aneurysm additionally considered.    2. No evidence of intracranial mass effect or midline shift.  No findings to suggest acute transcortical infarct by noncontrast CT.    Vessel Imaging   CTA Head 3/24/22   Unremarkable CTA of the head. Stable focus of extra-axial hemorrhage along the right aspect of the anterior falx.  No associated abnormal enhancement.

## 2022-03-25 NOTE — SUBJECTIVE & OBJECTIVE
Past Medical History:   Diagnosis Date    Diabetes mellitus     gestational    Hypertension     gestational      Past Surgical History:   Procedure Laterality Date    OVARIAN CYST DRAINAGE  2004      Current Facility-Administered Medications on File Prior to Encounter   Medication Dose Route Frequency Provider Last Rate Last Admin    [COMPLETED] acetaminophen tablet 1,000 mg  1,000 mg Oral ED 1 Time Ryne Mcghee MD   1,000 mg at 03/24/22 1304    [COMPLETED] iohexoL (OMNIPAQUE 350) injection 100 mL  100 mL Intravenous ONCE PRN Ryen Mcghee MD   100 mL at 03/24/22 1358    [COMPLETED] labetaloL injection 10 mg  10 mg Intravenous ED 1 Time Ryne Mcghee MD   10 mg at 03/24/22 1305    [COMPLETED] labetaloL injection 10 mg  10 mg Intravenous ED 1 Time Ryne Mcghee MD   10 mg at 03/24/22 1329    [COMPLETED] levETIRAcetam in NaCl (iso-os) IVPB 500 mg  500 mg Intravenous ED 1 Time Ryne Mcghee  mL/hr at 03/24/22 1304 500 mg at 03/24/22 1304    [DISCONTINUED] niCARdipine 40 mg/200 mL (0.2 mg/mL) infusion  2.5 mg/hr Intravenous Continuous Ryne Mcghee MD 12.5 mL/hr at 03/24/22 1345 2.5 mg/hr at 03/24/22 1345     Current Outpatient Medications on File Prior to Encounter   Medication Sig Dispense Refill    enalapril (VASOTEC) 5 MG tablet Take 5 mg by mouth once daily.      medroxyPROGESTERone (DEPO-PROVERA) 150 mg/mL injection Inject 150 mg into the muscle every 3 (three) months.        Allergies: Patient has no known allergies.    Family History   Problem Relation Age of Onset    No Known Problems Mother     Hypertension Father     Colon cancer Father     Throat cancer Father     Hypertension Brother      Social History     Tobacco Use    Smoking status: Never Smoker    Smokeless tobacco: Never Used   Substance Use Topics    Alcohol use: Yes     Comment: socially    Drug use: Never     Review of Systems   Constitutional:  Negative for chills, fatigue, fever and unexpected weight change.   HENT:   Negative for facial swelling, hearing loss and trouble swallowing.    Eyes:  Positive for visual disturbance (diplopia resolved). Negative for photophobia.   Respiratory:  Negative for chest tightness and shortness of breath.    Cardiovascular:  Positive for chest pain (resolved prior to admission). Negative for palpitations and leg swelling.   Gastrointestinal:  Negative for abdominal distention, abdominal pain, diarrhea, nausea and vomiting.   Genitourinary:  Negative for difficulty urinating and dysuria.   Musculoskeletal:  Negative for neck pain and neck stiffness.   Skin:  Negative for pallor, rash and wound.   Neurological:  Positive for headaches (resolved). Negative for dizziness, tremors, seizures, syncope, facial asymmetry, speech difficulty, weakness, light-headedness and numbness.   Psychiatric/Behavioral:  Negative for agitation and confusion.    Objective:     Vitals:    Temp: 98.4 °F (36.9 °C)  Pulse: 87  BP: 116/71  MAP (mmHg): 89  Resp: 16  SpO2: 100 %  O2 Device (Oxygen Therapy): room air    Temp  Min: 97.7 °F (36.5 °C)  Max: 98.4 °F (36.9 °C)  Pulse  Min: 81  Max: 120  BP  Min: 116/71  Max: 174/81  MAP (mmHg)  Min: 88  Max: 117  Resp  Min: 16  Max: 16  SpO2  Min: 80 %  Max: 100 %    No intake/output data recorded.           Physical Exam  Vitals and nursing note reviewed.   Constitutional:       General: She is not in acute distress.     Appearance: Normal appearance. She is not ill-appearing.   HENT:      Head: Normocephalic and atraumatic.      Mouth/Throat:      Mouth: Mucous membranes are moist.   Eyes:      Extraocular Movements: Extraocular movements intact.      Pupils: Pupils are equal, round, and reactive to light.   Cardiovascular:      Rate and Rhythm: Normal rate and regular rhythm.      Pulses: Normal pulses.      Heart sounds: Normal heart sounds.   Pulmonary:      Effort: Pulmonary effort is normal. No respiratory distress.      Breath sounds: Normal breath sounds.   Abdominal:       General: Abdomen is flat. There is no distension.      Tenderness: There is no abdominal tenderness.   Musculoskeletal:         General: No swelling or tenderness. Normal range of motion.      Cervical back: Normal range of motion. No rigidity.   Skin:     General: Skin is warm and dry.      Coloration: Skin is not jaundiced or pale.   Neurological:      General: No focal deficit present.      Mental Status: She is alert and oriented to person, place, and time. Mental status is at baseline.      Cranial Nerves: No cranial nerve deficit.      Sensory: No sensory deficit.      Motor: No weakness.      Coordination: Coordination normal.     Today I personally reviewed pertinent medications, imaging, laboratory results, notably:    3/24/2022 MRI Brain WO    Pending    3/24/2022 [facility]

## 2022-03-25 NOTE — ASSESSMENT & PLAN NOTE
Ce Ag is a 36 y.o. female with a significant medical history of HTN who presents to the hospital as a transfer for evaluation of abnormal CTH concerning for SAH vs calcification.  Repeat brain imaging with no change in findings.  Initially the patient had HA and blurred vision which are now resolved.  NIHSS 0.    -Consider cerebral angiogram as DDx includes RCVS  -SBP<160  -Neurosurgery was consulted and signed off.

## 2022-03-25 NOTE — CONSULTS
[facility] - Emergency Dept  Neurocritical Care  Consult Note    Admit Date: 3/24/2022  Service Date: 03/24/2022  Length of Stay: 0    Consults     Subjective:     Chief Complaint: Abnormal brain CT    History of Present Illness: Ce Ag is a 36 year old female with a medical history significant for HTN who presented to OSH on 3/24/2022 for evaluation and management of a severe frontal headache with associated blurred vision and chest pain x1 hour. Per chart, patient was given 1000mg of tylenol with resolution of symptoms. CTH at OSH showed a small frontal extra-axial hyperintensity with concern for SAH vs calcification. CTA unremarkable for aneurysm/vascular malformation. Patient was transferred to [facility] [place] [facility] for neurosurgical evaluation. On arrival, patient was neuro intact and without acute neurologic deficit, headache or vision change.     MRI Brain to further characterize CT findings pending without evidence of acute blood.       Past Medical History:   Diagnosis Date    Diabetes mellitus     gestational    Hypertension     gestational      Past Surgical History:   Procedure Laterality Date    OVARIAN CYST DRAINAGE  2004      Current Facility-Administered Medications on File Prior to Encounter   Medication Dose Route Frequency Provider Last Rate Last Admin    [COMPLETED] acetaminophen tablet 1,000 mg  1,000 mg Oral ED 1 Time Ryne Mcghee MD   1,000 mg at 03/24/22 1304    [COMPLETED] iohexoL (OMNIPAQUE 350) injection 100 mL  100 mL Intravenous ONCE PRN Ryne Mcghee MD   100 mL at 03/24/22 1358    [COMPLETED] labetaloL injection 10 mg  10 mg Intravenous ED 1 Time Ryne Mcghee MD   10 mg at 03/24/22 1305    [COMPLETED] labetaloL injection 10 mg  10 mg Intravenous ED 1 Time Ryne Mcghee MD   10 mg at 03/24/22 1329    [COMPLETED] levETIRAcetam in NaCl (iso-os) IVPB 500 mg  500 mg Intravenous ED 1 Time Ryne Mcghee  mL/hr at 03/24/22 1304 500 mg at 03/24/22 1304    [DISCONTINUED]  niCARdipine 40 mg/200 mL (0.2 mg/mL) infusion  2.5 mg/hr Intravenous Continuous Ryne Mcghee MD 12.5 mL/hr at 03/24/22 1345 2.5 mg/hr at 03/24/22 1345     Current Outpatient Medications on File Prior to Encounter   Medication Sig Dispense Refill    enalapril (VASOTEC) 5 MG tablet Take 5 mg by mouth once daily.      medroxyPROGESTERone (DEPO-PROVERA) 150 mg/mL injection Inject 150 mg into the muscle every 3 (three) months.        Allergies: Patient has no known allergies.    Family History   Problem Relation Age of Onset    No Known Problems Mother     Hypertension Father     Colon cancer Father     Throat cancer Father     Hypertension Brother      Social History     Tobacco Use    Smoking status: Never Smoker    Smokeless tobacco: Never Used   Substance Use Topics    Alcohol use: Yes     Comment: socially    Drug use: Never     Review of Systems   Constitutional:  Negative for chills, fatigue, fever and unexpected weight change.   HENT:  Negative for facial swelling, hearing loss and trouble swallowing.    Eyes:  Positive for visual disturbance (diplopia resolved). Negative for photophobia.   Respiratory:  Negative for chest tightness and shortness of breath.    Cardiovascular:  Positive for chest pain (resolved prior to admission). Negative for palpitations and leg swelling.   Gastrointestinal:  Negative for abdominal distention, abdominal pain, diarrhea, nausea and vomiting.   Genitourinary:  Negative for difficulty urinating and dysuria.   Musculoskeletal:  Negative for neck pain and neck stiffness.   Skin:  Negative for pallor, rash and wound.   Neurological:  Positive for headaches (resolved). Negative for dizziness, tremors, seizures, syncope, facial asymmetry, speech difficulty, weakness, light-headedness and numbness.   Psychiatric/Behavioral:  Negative for agitation and confusion.    Objective:     Vitals:    Temp: 98.4 °F (36.9 °C)  Pulse: 87  BP: 116/71  MAP (mmHg): 89  Resp: 16  SpO2: 100  %  O2 Device (Oxygen Therapy): room air    Temp  Min: 97.7 °F (36.5 °C)  Max: 98.4 °F (36.9 °C)  Pulse  Min: 81  Max: 120  BP  Min: 116/71  Max: 174/81  MAP (mmHg)  Min: 88  Max: 117  Resp  Min: 16  Max: 16  SpO2  Min: 80 %  Max: 100 %    No intake/output data recorded.           Physical Exam  Vitals and nursing note reviewed.   Constitutional:       General: She is not in acute distress.     Appearance: Normal appearance. She is not ill-appearing.   HENT:      Head: Normocephalic and atraumatic.      Mouth/Throat:      Mouth: Mucous membranes are moist.   Eyes:      Extraocular Movements: Extraocular movements intact.      Pupils: Pupils are equal, round, and reactive to light.   Cardiovascular:      Rate and Rhythm: Normal rate and regular rhythm.      Pulses: Normal pulses.      Heart sounds: Normal heart sounds.   Pulmonary:      Effort: Pulmonary effort is normal. No respiratory distress.      Breath sounds: Normal breath sounds.   Abdominal:      General: Abdomen is flat. There is no distension.      Tenderness: There is no abdominal tenderness.   Musculoskeletal:         General: No swelling or tenderness. Normal range of motion.      Cervical back: Normal range of motion. No rigidity.   Skin:     General: Skin is warm and dry.      Coloration: Skin is not jaundiced or pale.   Neurological:      General: No focal deficit present.      Mental Status: She is alert and oriented to person, place, and time. Mental status is at baseline.      Cranial Nerves: No cranial nerve deficit.      Sensory: No sensory deficit.      Motor: No weakness.      Coordination: Coordination normal.     Today I personally reviewed pertinent medications, imaging, laboratory results, notably:    3/24/2022 MRI Brain WO    Pending    3/24/2022 CT          Assessment/Plan:     Neuro  * Abnormal brain CT  36F with history of HTN transferred from OSH for evaluation of small hyperintensity seen on CT (SAH vs calcification) after presenting  for evaluation of HA and blurred vision, now resolved.    - Neuro intact  - HA resolved with 1000mg Tylenol  - Neurosurgery consulted and signed off  - MRI Brain without acute blood     Neuro ICU will sign off at this time. Please contact with any additional questions or concerns.     Cardiac/Vascular  Essential hypertension, benign  Continue home meds          The patient is being Prophylaxed for:  Venous Thromboembolism with: Not Applicable   Stress Ulcer with: Not Applicable   Ventilator Pneumonia with: not applicable    Activity Orders          None        Prior    Jorge Luis Ruffin MD  Neurocritical Care  [facility] - Emergency Dept

## 2022-03-25 NOTE — HOSPITAL COURSE
03/25/2022 MRI overnight with small anterior paramedian frontal lobe hemorrhage. Concern for RCVS, started on Verapamil. Remains asymptomatic, NIH 0. Step down to vascular neurology.

## 2022-03-25 NOTE — HOSPITAL COURSE
03/25/2022 Denies HA. Okay for step down to NPU. NCC obtained MRI Brain W WO contrast, suggestive of meningioma. NSGY will discharge the patient to home. No further stroke team needs. Will sign off.

## 2022-03-25 NOTE — HOSPITAL COURSE
Patient was admitted for sudden onset headache on 3/24/2022 and was monitored closely in the ICU setting but did not require operative managment. MRI and CTA were negative for SAH or vascular malformation/aneurysm. MRI demonstrated tiny falcine meningioma which was determined to be appropriate for outpatient follow up. The patient remained on appropriate DVT prophylaxis during the course of admission. At the time of discharge, the patient was tolerating PO intake without N/V, dysphagia, denied bowel or bladder dysfunction, denied new neurological symptoms, and reported pain controlled with current regimen. The patient will follow up in clinic as indicated in discharge instructions. All questions were answered and continued treatment instructions were discussed in detail prior to discharge.

## 2022-03-25 NOTE — PROGRESS NOTES
Travis Ambriz - Neurosurgery ([facility])  Vascular Neurology  Comprehensive Stroke Center  Progress Note    Assessment/Plan:     * Meningioma  Patient is a 36 year old female with PMH of HTN who was transferred for evaluation of an abnormal CTH. Patient initially had HA and blurry vision that resolved prior to her arrival. CTH was originally thought to be SAH or ICH. She was admitted to NCC and further work up completed for possible RCVS.  MRI W WO Contrast showed meningoma. She will be discharged home by NSGY with follow up as they see fit.     Stroke team will sign off at this time. No further recommendations or follow up with stroke indicated.     Abnormal brain CT  Ce Ag is a 36 y.o. female with a significant medical history of HTN who presents to the hospital as a transfer for evaluation of abnormal CTH concerning for SAH vs calcification.  Repeat brain imaging with no change in findings.  Initially the patient had HA and blurred vision which are now resolved.  NIHSS 0.    -Consider cerebral angiogram as DDx includes RCVS  -SBP<160  -Neurosurgery was consulted and signed off.    Essential hypertension, benign  No ICH/SAH   SBP goal normotension          03/25/2022 Denies HA. Okay for step down to NPU. NCC obtained MRI Brain W WO contrast, suggestive of meningioma. NSGY will discharge the patient to home. No further stroke team needs. Will sign off.        STROKE DOCUMENTATION        NIH Scale:  1a. Level of Consciousness: 0-->Alert, keenly responsive  1b. LOC Questions: 0-->Answers both questions correctly  1c. LOC Commands: 0-->Performs both tasks correctly  2. Best Gaze: 0-->Normal  3. Visual: 0-->No visual loss  4. Facial Palsy: 0-->Normal symmetrical movements  5a. Motor Arm, Left: 0-->No drift, limb holds 90 (or 45) degrees for full 10 secs  5b. Motor Arm, Right: 0-->No drift, limb holds 90 (or 45) degrees for full 10 secs  6a. Motor Leg, Left: 0-->No drift, leg holds 30 degree position for full 5  secs  6b. Motor Leg, Right: 0-->No drift, leg holds 30 degree position for full 5 secs  7. Limb Ataxia: 0-->Absent  8. Sensory: 0-->Normal, no sensory loss  9. Best Language: 0-->No aphasia, normal  10. Dysarthria: 0-->Normal  11. Extinction and Inattention (formerly Neglect): 0-->No abnormality  Total (NIH Stroke Scale): 0       Modified Blanquita Score: 0  Francisco Javier Coma Scale:    ABCD2 Score:    [id number] Score:   HAS -BLED Score:   ICH Score:0  Hunt & Guerra Classification:Grade I     Hemorrhagic change of an Ischemic Stroke: Does this patient have an ischemic stroke with hemorrhagic changes? No     Neurologic Chief Complaint: HA, SAH    Subjective:     Interval History: Patient is seen for follow-up neurological assessment and treatment recommendations: Denies HA. Okay for step down to NPU. NCC obtained MRI Brain W WO contrast, suggestive of meningioma. NSGY will discharge the patient to home. No further stroke team needs. Will sign off.      HPI, Past Medical, Family, and Social History remains the same as documented in the initial encounter.     Review of Systems   Constitutional:  Negative for chills and fever.   HENT:  Negative for drooling, hearing loss, rhinorrhea and trouble swallowing.    Eyes:  Negative for pain, redness and visual disturbance.   Respiratory:  Negative for cough, choking and shortness of breath.    Cardiovascular:  Negative for chest pain.   Gastrointestinal:  Negative for abdominal pain, diarrhea, nausea and vomiting.   Genitourinary:  Negative for difficulty urinating.   Musculoskeletal:  Negative for gait problem.   Neurological:  Negative for facial asymmetry, speech difficulty, weakness, numbness and headaches.   Psychiatric/Behavioral:  Negative for agitation, behavioral problems and confusion. The patient is not nervous/anxious.    Scheduled Meds:   senna-docusate 8.6-50 mg  1 tablet Oral BID    verapamiL  40 mg Oral TID     Continuous Infusions:   sodium chloride 0.9% 75 mL/hr  at 03/25/22 0054     PRN Meds:acetaminophen, labetalol, ondansetron, sodium chloride 0.9%    Objective:     Vital Signs (Most Recent):  Temp: 97.6 °F (36.4 °C) (03/25/22 1417)  Pulse: 84 (03/25/22 1607)  Resp: 20 (03/25/22 1417)  BP: (!) 152/72 (03/25/22 1417)  SpO2: 99 % (03/25/22 1417)  BP Location: Right arm    Vital Signs Range (Last 24H):  Temp:  [97.6 °F (36.4 °C)-98.7 °F (37.1 °C)]   Pulse:  []   Resp:  [16-24]   BP: (115-152)/(70-80)   SpO2:  [98 %-100 %]   BP Location: Right arm    Physical Exam  Vitals and nursing note reviewed.   Constitutional:       General: She is not in acute distress.     Appearance: She is not ill-appearing or diaphoretic.   HENT:      Head: Normocephalic and atraumatic.      Right Ear: External ear normal.      Left Ear: External ear normal.      Nose: No rhinorrhea.   Eyes:      General: No visual field deficit or scleral icterus.        Right eye: No discharge.         Left eye: No discharge.      Extraocular Movements: Extraocular movements intact.   Cardiovascular:      Rate and Rhythm: Normal rate.   Pulmonary:      Effort: Pulmonary effort is normal. No respiratory distress.   Abdominal:      General: There is no distension.   Musculoskeletal:         General: No deformity.   Skin:     General: Skin is warm and dry.   Neurological:      Mental Status: She is alert and oriented to person, place, and time.      Cranial Nerves: No dysarthria or facial asymmetry.      Sensory: No sensory deficit.      Motor: No weakness or pronator drift.      Coordination: Coordination normal.   Psychiatric:         Attention and Perception: Attention normal.         Mood and Affect: Mood normal.         Speech: Speech normal.         Behavior: Behavior normal. Behavior is cooperative.       Neurological Exam:   LOC: alert  Attention Span: Good   Language: No aphasia  Articulation: No dysarthria  Orientation: Person, Place, Time   Visual Fields: Full  EOM (CN III, IV, VI):  Full/intact  Facial Movement (CN VII): Symmetric facial expression    Motor: Arm left  Normal 5/5  Leg left  Normal 5/5  Arm right  Normal 5/5  Leg right Normal 5/5  Sensation: intact to light touch     Laboratory:  CMP:   Recent Labs   Lab 03/25/22  0755   CALCIUM 8.8   ALBUMIN 3.6   PROT 5.8*      K 3.9   CO2 21*   *   BUN 9   CREATININE 0.7   ALKPHOS 38*   ALT 14   AST 16   BILITOT 0.5     BMP:   Recent Labs   Lab 03/25/22  0755      K 3.9   *   CO2 21*   BUN 9   CREATININE 0.7   CALCIUM 8.8     CBC:   Recent Labs   Lab 03/25/22  0755   WBC 5.48   RBC 3.49*   HGB 10.8*   HCT 31.6*      MCV 91   MCH 30.9   MCHC 34.2     Lipid Panel: No results for input(s): CHOL, LDLCALC, HDL, TRIG in the last 168 hours.  Coagulation:   Recent Labs   Lab 03/25/22  0755   INR 1.0   APTT 25.1     Platelet Aggregation Study: No results for input(s): PLTAGG, PLTAGINTERP, PLTAGREGLACO, ADPPLTAGGREG in the last 168 hours.  Hgb A1C:   Recent Labs   Lab 03/25/22  0052   HGBA1C 5.1     TSH:   Recent Labs   Lab 03/25/22  0052   TSH 1.034       Diagnostic Results     Brain Imaging   MRI W WO Contrast 3/25/22   Homogeneously enhancing oval focus along the anterior paramedian frontal lobe with close association of the associated dural lining.  Imaging findings favor a small meningioma, noting a focal region of hemorrhage is felt less likely given enhancement characteristics.    MRI Brain WO Contrast 3/24/22  Small focus of T1 isointense/T2 hypointense signal associated with susceptibility artifact along the anterior paramedian frontal lobe, in keeping with a small focus of acute hemorrhage as seen on CT and CTA same day.  No additional hemorrhage or major vascular distribution infarct identified    CTH 3/24/22     1. Small volume frontal extra-axial, favor subarachnoid, hemorrhage without significant mass effect.  In the absence of history of trauma, differential considerations would include but not limited to  reversible cerebral vasoconstriction syndrome (RCVS), vasculitis, venous thrombosis, vasoactive medication use.  Mycotic aneurysm additionally considered.    2. No evidence of intracranial mass effect or midline shift.  No findings to suggest acute transcortical infarct by noncontrast CT.    Vessel Imaging   CTA Head 3/24/22   Unremarkable CTA of the head. Stable focus of extra-axial hemorrhage along the right aspect of the anterior falx.  No associated abnormal enhancement.         Yoav Long PA-C  [facility]  Department of Vascular Neurology   [facility] - Neurosurgery [facility])

## 2022-03-28 NOTE — DISCHARGE SUMMARY
[facility]ravis Ambriz - Neurosurgery ([facility])  Neurosurgery  Discharge Summary      Patient Name: Ce Ag  MRN: [id number]  Admission Date: 3/24/2022  Hospital Length of Stay: 1 days  Discharge Date and Time:  03/28/2022 3:40 PM  Attending Physician: No att. providers found   Discharging Provider: Nereyda Canales MD  Primary Care Provider: Ron Oseguera MD    HPI:   36 F PMHx HTN presenting from OSH with sudden onset severe headache and chest pain  and blurry vision starting earlier today, lasting for 1 hour, with gradual improvement afterwards. Blurry vision has resolved. Pt currently reports some persistent throbbing, but is otherwise resting comfortably. She denies LOC, seizures, nausea/vomiting, or focal weakness.     CTH/CTA negative for SAH or aneurysm/vascular malformation. Tiny falcine hyperdensity seen, possibly SDH, stable on rpt scan.       * No surgery found *     Hospital Course: Patient was admitted for sudden onset headache on 3/24/2022 and was monitored closely in the ICU setting but did not require operative managment. MRI and CTA were negative for SAH or vascular malformation/aneurysm. MRI demonstrated tiny falcine meningioma which was determined to be appropriate for outpatient follow up. The patient remained on appropriate DVT prophylaxis during the course of admission. At the time of discharge, the patient was tolerating PO intake without N/V, dysphagia, denied bowel or bladder dysfunction, denied new neurological symptoms, and reported pain controlled with current regimen. The patient will follow up in clinic as indicated in discharge instructions. All questions were answered and continued treatment instructions were discussed in detail prior to discharge.      Goals of Care Treatment Preferences:  Code Status: Full Code      Consults:   Consults (From admission, onward)        Status Ordering Provider     Inpatient consult to Physical Medicine Rehab  Once        Provider:  (Not yet assigned)     Completed COURTNEY MOONEY     Inpatient consult to Neurosurgery  Once        Provider:  (Not yet assigned)    Completed KAYCE CRAMER            Pending Diagnostic Studies:     Procedure Component Value Units Date/Time    IR Angiogram Carotid Cerebral Bilateral [[id number]]     Order Status: Sent Lab Status: No result         Final Active Diagnoses:    Diagnosis Date Noted POA    PRINCIPAL PROBLEM:  Meningioma [D32.9] 03/25/2022 Yes    Reversible cerebrovascular vasoconstriction syndrome [I67.841] 03/25/2022 Yes    Abnormal brain CT [R90.89] 03/24/2022 Yes    Essential hypertension, benign [I10] 03/15/2022 Yes      Problems Resolved During this Admission:    Diagnosis Date Noted Date Resolved POA    SDH (subdural hematoma) [S06.5X9A] 03/24/2022 03/24/2022 Unknown      Discharged Condition: good     Disposition: Home or Self Care    Follow Up:   Follow-up Information     Travis nena - Neurology [facility] Fl. Schedule an appointment as soon as possible for a visit in 5 days.    Specialty: Neurology  Why: For reevaluation, If symptoms do not improve  Contact information:  [address] [facility] [id number] [phone]  Additional information:  Neuroscience [place] - [facility], 7th Floor   Please park in [facility] and take Clinic elevator           Cullen Hdz MD Follow up in 12 month(s).    Specialty: Neurosurgery  Why: with rpt MRI  Contact information:  [address]  [place] [address]  [phone]                       Patient Instructions:      Ambulatory referral/consult to Neurology   Standing Status: Future   Referral Priority: Routine Referral Type: Consultation   Referral Reason: Specialty Services Required   Requested Specialty: Neurology   Number of Visits Requested: 1     Notify your health care provider if you experience any of the following:  temperature >100.4     Notify your health care provider if you experience any of the following:  persistent nausea and  vomiting or diarrhea     Notify your health care provider if you experience any of the following:  severe uncontrolled pain     Notify your health care provider if you experience any of the following:  redness, tenderness, or signs of infection (pain, swelling, redness, odor or green/yellow discharge around incision site)     Notify your health care provider if you experience any of the following:  difficulty breathing or increased cough     Notify your health care provider if you experience any of the following:  severe persistent headache     Notify your health care provider if you experience any of the following:  worsening rash     Notify your health care provider if you experience any of the following:  persistent dizziness, light-headedness, or visual disturbances     Notify your health care provider if you experience any of the following:  increased confusion or weakness     Activity as tolerated     Medications:  Reconciled Home Medications:      Medication List      START taking these medications    verapamiL 40 MG Tab  Commonly known as: CALAN  Take 1 tablet (40 mg total) by mouth 3 (three) times daily.        STOP taking these medications    enalapril 5 MG tablet  Commonly known as: VASOTEC     medroxyPROGESTERone 150 mg/mL injection  Commonly known as: DEPO-PROVERA            Nereyda Canales MD  Neurosurgery  [facility] - Neurosurgery ([facility])

## 2022-03-30 ENCOUNTER — TELEPHONE (OUTPATIENT)
Dept: NEUROLOGY | Facility: CLINIC | Age: 37
End: 2022-03-30
Payer: COMMERCIAL

## 2022-04-06 ENCOUNTER — OFFICE VISIT (OUTPATIENT)
Dept: PRIMARY CARE CLINIC | Facility: CLINIC | Age: 37
End: 2022-04-06
Payer: COMMERCIAL

## 2022-04-06 VITALS
RESPIRATION RATE: 18 BRPM | SYSTOLIC BLOOD PRESSURE: 125 MMHG | OXYGEN SATURATION: 99 % | HEART RATE: 90 BPM | BODY MASS INDEX: 17.16 KG/M2 | WEIGHT: 93.25 LBS | DIASTOLIC BLOOD PRESSURE: 75 MMHG | HEIGHT: 62 IN

## 2022-04-06 DIAGNOSIS — I10 ESSENTIAL HYPERTENSION, BENIGN: ICD-10-CM

## 2022-04-06 DIAGNOSIS — D32.9 MENINGIOMA: Primary | ICD-10-CM

## 2022-04-06 PROCEDURE — 3074F SYST BP LT 130 MM HG: CPT | Mod: CPTII,S$GLB,, | Performed by: FAMILY MEDICINE

## 2022-04-06 PROCEDURE — 1159F MED LIST DOCD IN RCRD: CPT | Mod: CPTII,S$GLB,, | Performed by: FAMILY MEDICINE

## 2022-04-06 PROCEDURE — 99999 PR PBB SHADOW E&M-EST. PATIENT-LVL III: ICD-10-PCS | Mod: PBBFAC,,, | Performed by: FAMILY MEDICINE

## 2022-04-06 PROCEDURE — 3074F PR MOST RECENT SYSTOLIC BLOOD PRESSURE < 130 MM HG: ICD-10-PCS | Mod: CPTII,S$GLB,, | Performed by: FAMILY MEDICINE

## 2022-04-06 PROCEDURE — 3044F PR MOST RECENT HEMOGLOBIN A1C LEVEL <7.0%: ICD-10-PCS | Mod: CPTII,S$GLB,, | Performed by: FAMILY MEDICINE

## 2022-04-06 PROCEDURE — 1160F PR REVIEW ALL MEDS BY PRESCRIBER/CLIN PHARMACIST DOCUMENTED: ICD-10-PCS | Mod: CPTII,S$GLB,, | Performed by: FAMILY MEDICINE

## 2022-04-06 PROCEDURE — 3044F HG A1C LEVEL LT 7.0%: CPT | Mod: CPTII,S$GLB,, | Performed by: FAMILY MEDICINE

## 2022-04-06 PROCEDURE — 99214 OFFICE O/P EST MOD 30 MIN: CPT | Mod: S$GLB,,, | Performed by: FAMILY MEDICINE

## 2022-04-06 PROCEDURE — 3008F PR BODY MASS INDEX (BMI) DOCUMENTED: ICD-10-PCS | Mod: CPTII,S$GLB,, | Performed by: FAMILY MEDICINE

## 2022-04-06 PROCEDURE — 99999 PR PBB SHADOW E&M-EST. PATIENT-LVL III: CPT | Mod: PBBFAC,,, | Performed by: FAMILY MEDICINE

## 2022-04-06 PROCEDURE — 3078F PR MOST RECENT DIASTOLIC BLOOD PRESSURE < 80 MM HG: ICD-10-PCS | Mod: CPTII,S$GLB,, | Performed by: FAMILY MEDICINE

## 2022-04-06 PROCEDURE — 3078F DIAST BP <80 MM HG: CPT | Mod: CPTII,S$GLB,, | Performed by: FAMILY MEDICINE

## 2022-04-06 PROCEDURE — 1159F PR MEDICATION LIST DOCUMENTED IN MEDICAL RECORD: ICD-10-PCS | Mod: CPTII,S$GLB,, | Performed by: FAMILY MEDICINE

## 2022-04-06 PROCEDURE — 4010F ACE/ARB THERAPY RXD/TAKEN: CPT | Mod: CPTII,S$GLB,, | Performed by: FAMILY MEDICINE

## 2022-04-06 PROCEDURE — 1160F RVW MEDS BY RX/DR IN RCRD: CPT | Mod: CPTII,S$GLB,, | Performed by: FAMILY MEDICINE

## 2022-04-06 PROCEDURE — 3008F BODY MASS INDEX DOCD: CPT | Mod: CPTII,S$GLB,, | Performed by: FAMILY MEDICINE

## 2022-04-06 PROCEDURE — 4010F PR ACE/ARB THEARPY RXD/TAKEN: ICD-10-PCS | Mod: CPTII,S$GLB,, | Performed by: FAMILY MEDICINE

## 2022-04-06 PROCEDURE — 99214 PR OFFICE/OUTPT VISIT, EST, LEVL IV, 30-39 MIN: ICD-10-PCS | Mod: S$GLB,,, | Performed by: FAMILY MEDICINE

## 2022-04-06 RX ORDER — AMLODIPINE BESYLATE 2.5 MG/1
2.5 TABLET ORAL DAILY
Qty: 30 TABLET | Refills: 0 | Status: SHIPPED | OUTPATIENT
Start: 2022-04-06 | End: 2022-05-02 | Stop reason: SDUPTHER

## 2022-04-06 NOTE — PROGRESS NOTES
Subjective:       Patient ID: Ce Ag is a 36 y.o. female.    Chief Complaint: Hospital Follow Up, Headache, and Sore Throat    HPI:   36 F PMHx HTN presenting from OSH with sudden onset severe headache and chest pain  and blurry vision starting earlier today, lasting for 1 hour, with gradual improvement afterwards. Blurry vision has resolved. Pt currently reports some persistent throbbing, but is otherwise resting comfortably. She denies LOC, seizures, nausea/vomiting, or focal weakness.      CTH/CTA negative for SAH or aneurysm/vascular malformation. Tiny falcine hyperdensity seen, possibly SDH, stable on rpt scan.         Hospital Course: Patient was admitted for sudden onset headache on 3/24/2022 and was monitored closely in the ICU setting but did not require operative managment. MRI and CTA were negative for SAH or vascular malformation/aneurysm. MRI demonstrated tiny falcine meningioma which was determined to be appropriate for outpatient follow up. The patient remained on appropriate DVT prophylaxis during the course of admission. At the time of discharge, the patient was tolerating PO intake without N/V, dysphagia, denied bowel or bladder dysfunction, denied new neurological symptoms, and reported pain controlled with current regimen. The patient will follow up in clinic as indicated in discharge instructions. All questions were answered and continued treatment instructions were discussed in detail prior to discharge.    Since discharge, she reports that she has been feeling much better.  No severe headaches.  Blood pressure has been consistently normal on home monitoring, but she finds that the verapamil is making her slightly dizzy, and she is struggling with the t.i.d. dosing.  No chest pains or shortness of breath.  No nausea, vomiting or constipation.  No lower extremity edema.  Has an appointment scheduled with an outside neurosurgery group to follow the recently diagnosed meningioma.   "Planning on enrolling in digital hypertension program, as well.    Review of Systems   Constitutional: Negative for fever.   Eyes: Negative for visual disturbance.   Respiratory: Negative for shortness of breath.    Cardiovascular: Negative for chest pain, palpitations and leg swelling.   Gastrointestinal: Negative for constipation.   Neurological: Positive for dizziness. Negative for seizures and syncope.   Psychiatric/Behavioral: Negative for agitation and confusion.       Objective:      Vitals:    04/06/22 1131 04/06/22 1159   BP: (!) 168/102 125/75   BP Location: Right arm    Patient Position: Sitting    BP Method: Medium (Manual)    Pulse: 90    Resp: 18    SpO2: 99%    Weight: 42.3 kg (93 lb 4.1 oz)    Height: 5' 2" (1.575 m)      Physical Exam  Vitals and nursing note reviewed.   Constitutional:       Appearance: She is well-developed.   HENT:      Head: Normocephalic and atraumatic.      Mouth/Throat:      Mouth: Mucous membranes are moist.      Pharynx: No oropharyngeal exudate.   Neck:      Vascular: No carotid bruit.   Cardiovascular:      Rate and Rhythm: Normal rate and regular rhythm.      Heart sounds: Normal heart sounds.   Pulmonary:      Effort: Pulmonary effort is normal.      Breath sounds: Normal breath sounds.   Abdominal:      General: Bowel sounds are normal. There is no distension.      Tenderness: There is no abdominal tenderness.   Musculoskeletal:      Right lower leg: No edema.      Left lower leg: No edema.   Skin:     General: Skin is warm and dry.   Neurological:      Mental Status: She is alert and oriented to person, place, and time.         Lab Results   Component Value Date    WBC 5.48 03/25/2022    HGB 10.8 (L) 03/25/2022    HCT 31.6 (L) 03/25/2022     03/25/2022    CHOL 155 03/16/2022    TRIG 95 03/16/2022    HDL 53 03/16/2022    ALT 14 03/25/2022    AST 16 03/25/2022     03/25/2022    K 3.9 03/25/2022     (H) 03/25/2022    CREATININE 0.7 03/25/2022    BUN 9 " 03/25/2022    CO2 21 (L) 03/25/2022    TSH 1.034 03/25/2022    INR 1.0 03/25/2022    HGBA1C 5.1 03/25/2022   Transitional Care Note    Family and/or Caretaker present at visit?  Yes.  Diagnostic tests reviewed/disposition: No diagnosic tests pending after this hospitalization.  Disease/illness education: yes  Home health/community services discussion/referrals: Patient does not have home health established from hospital visit.  They do not need home health.  If needed, we will set up home health for the patient.   Establishment or re-establishment of referral orders for community resources: No other necessary community resources.   Discussion with other health care providers: No discussion with other health care providers necessary.          Assessment:       1. Meningioma    2. Essential hypertension, benign        Plan:       Meningioma  Follow-up with Dr. Leal as scheduled  Essential hypertension, benign  Switch from verapamil to low-dose amlodipine for better tolerability and dosing convenience.  Encouraged to complete enrollment in digital hypertension program  Other orders  -     amLODIPine (NORVASC) 2.5 MG tablet; Take 1 tablet (2.5 mg total) by mouth once daily.  Dispense: 30 tablet; Refill: 0      Medication List with Changes/Refills   New Medications    AMLODIPINE (NORVASC) 2.5 MG TABLET    Take 1 tablet (2.5 mg total) by mouth once daily.   Discontinued Medications    VERAPAMIL (CALAN) 40 MG TAB    Take 1 tablet (40 mg total) by mouth 3 (three) times daily.

## 2022-05-12 ENCOUNTER — TELEPHONE (OUTPATIENT)
Dept: PRIMARY CARE CLINIC | Facility: CLINIC | Age: 37
End: 2022-05-12
Payer: COMMERCIAL

## 2022-05-12 DIAGNOSIS — Z30.42 ENCOUNTER FOR MANAGEMENT AND INJECTION OF INJECTABLE PROGESTIN CONTRACEPTIVE: Primary | ICD-10-CM

## 2022-05-12 RX ORDER — MEDROXYPROGESTERONE ACETATE 150 MG/ML
150 INJECTION, SUSPENSION INTRAMUSCULAR
Status: SHIPPED | OUTPATIENT
Start: 2022-05-12

## 2022-05-12 NOTE — TELEPHONE ENCOUNTER
----- Message from Mitch Lopez sent at 5/12/2022  8:08 AM CDT -----  Contact: pt  Pt is calling because she needs a DEPO Injection by this week and is calling to see if she can come in today or tomorrow to receive it.Thank you

## 2022-05-13 ENCOUNTER — CLINICAL SUPPORT (OUTPATIENT)
Dept: PRIMARY CARE CLINIC | Facility: CLINIC | Age: 37
End: 2022-05-13
Payer: COMMERCIAL

## 2022-05-13 DIAGNOSIS — Z30.42 ENCOUNTER FOR MANAGEMENT AND INJECTION OF INJECTABLE PROGESTIN CONTRACEPTIVE: Primary | ICD-10-CM

## 2022-05-13 LAB
B-HCG UR QL: NEGATIVE
CTP QC/QA: YES

## 2022-05-13 PROCEDURE — 96372 PR INJECTION,THERAP/PROPH/DIAG2ST, IM OR SUBCUT: ICD-10-PCS | Mod: S$GLB,,, | Performed by: FAMILY MEDICINE

## 2022-05-13 PROCEDURE — 81025 POCT URINE PREGNANCY: ICD-10-PCS | Mod: S$GLB,,, | Performed by: FAMILY MEDICINE

## 2022-05-13 PROCEDURE — 96372 THER/PROPH/DIAG INJ SC/IM: CPT | Mod: S$GLB,,, | Performed by: FAMILY MEDICINE

## 2022-05-13 PROCEDURE — 81025 URINE PREGNANCY TEST: CPT | Mod: S$GLB,,, | Performed by: FAMILY MEDICINE

## 2022-05-13 PROCEDURE — 99999 PR PBB SHADOW E&M-EST. PATIENT-LVL II: ICD-10-PCS | Mod: PBBFAC,,,

## 2022-05-13 PROCEDURE — 99999 PR PBB SHADOW E&M-EST. PATIENT-LVL II: CPT | Mod: PBBFAC,,,

## 2022-05-13 RX ADMIN — MEDROXYPROGESTERONE ACETATE 150 MG: 150 INJECTION, SUSPENSION INTRAMUSCULAR at 10:05

## 2022-05-13 NOTE — PROGRESS NOTES
Pregnancy test done with negative result. After obtaining consent, and per orders of Dr. Oseguera, injection of DepoPrevero 150mg given by Aleisha Limon. Patient instructed to remain in clinic for 20 minutes afterwards, and to report any adverse reaction to me immediately. No complications with injection or site.

## 2022-08-12 ENCOUNTER — CLINICAL SUPPORT (OUTPATIENT)
Dept: PRIMARY CARE CLINIC | Facility: CLINIC | Age: 37
End: 2022-08-12
Payer: COMMERCIAL

## 2022-08-12 DIAGNOSIS — Z30.42 ENCOUNTER FOR MANAGEMENT AND INJECTION OF INJECTABLE PROGESTIN CONTRACEPTIVE: ICD-10-CM

## 2022-08-12 LAB
B-HCG UR QL: NEGATIVE
CTP QC/QA: YES

## 2022-08-12 PROCEDURE — 96372 PR INJECTION,THERAP/PROPH/DIAG2ST, IM OR SUBCUT: ICD-10-PCS | Mod: S$GLB,,, | Performed by: FAMILY MEDICINE

## 2022-08-12 PROCEDURE — 99999 PR PBB SHADOW E&M-EST. PATIENT-LVL II: CPT | Mod: PBBFAC,,,

## 2022-08-12 PROCEDURE — 81025 URINE PREGNANCY TEST: CPT | Mod: S$GLB,,, | Performed by: FAMILY MEDICINE

## 2022-08-12 PROCEDURE — 96372 THER/PROPH/DIAG INJ SC/IM: CPT | Mod: S$GLB,,, | Performed by: FAMILY MEDICINE

## 2022-08-12 PROCEDURE — 99999 PR PBB SHADOW E&M-EST. PATIENT-LVL II: ICD-10-PCS | Mod: PBBFAC,,,

## 2022-08-12 PROCEDURE — 81025 POCT URINE PREGNANCY: ICD-10-PCS | Mod: S$GLB,,, | Performed by: FAMILY MEDICINE

## 2022-08-12 RX ADMIN — MEDROXYPROGESTERONE ACETATE 150 MG: 150 INJECTION, SUSPENSION INTRAMUSCULAR at 10:08

## 2022-08-12 NOTE — PROGRESS NOTES
Verified pt ID using name and . Aleisha ALEJANDRE Administered MedroxyProgesterone 150mg in RUOQ per physician order using aseptic technique. Aspirated and no blood return noted. Pt tolerated well with no adverse reactions noted.

## 2022-08-31 ENCOUNTER — E-VISIT (OUTPATIENT)
Dept: PRIMARY CARE CLINIC | Facility: CLINIC | Age: 37
End: 2022-08-31
Payer: COMMERCIAL

## 2022-08-31 DIAGNOSIS — U07.1 COVID-19: Primary | ICD-10-CM

## 2022-08-31 PROCEDURE — 99421 OL DIG E/M SVC 5-10 MIN: CPT | Mod: S$GLB,,, | Performed by: FAMILY MEDICINE

## 2022-08-31 PROCEDURE — 99421 PR E&M, ONLINE DIGIT, EST, < 7 DAYS, 5-10 MINS: ICD-10-PCS | Mod: S$GLB,,, | Performed by: FAMILY MEDICINE

## 2022-08-31 RX ORDER — CODEINE PHOSPHATE AND GUAIFENESIN 10; 100 MG/5ML; MG/5ML
5 SOLUTION ORAL EVERY 6 HOURS PRN
Qty: 120 ML | Refills: 0 | Status: SHIPPED | OUTPATIENT
Start: 2022-08-31 | End: 2022-09-14 | Stop reason: ALTCHOICE

## 2022-08-31 NOTE — PROGRESS NOTES
Patient ID: Ce Ag is a 37 y.o. female.    Chief Complaint: No chief complaint on file.    The patient initiated a request through [employer] on 8/31/2022 for evaluation and management with a chief complaint of COVID     I evaluated the questionnaire submission on 8/31/22.    Ohs Peq Evisit Upper Respitatory/Cough Questionnaire    8/31/2022 10:08 AM CDT - Filed by Patient   Do you agree to participate in an E-Visit? Yes   If you have any of the following symptoms, please present to your local ER or call 911:  I acknowledge   What is the main issue that you would like for your doctor to address today? I just tested positive for Covid, i have a fever of 101, see if u could call me in some medicine   Are you able to take your vital signs? No   What symptoms do you currently have?  Chills;  Cough;  Nasal Congestion;  Muscle or body aches;  Sore throat   Have you had a fever? Yes   What has been the range of your fever? 100.4 or greater   When did your symptoms first appear? 8/29/2022   In the last two weeks, have you been in close contact with someone who has COVID-19? Yes   In the last two weeks, have you worked or volunteered in a healthcare facility or as a ? Healthcare facilities include a hospital, medical or dental clinic, long-term care facility, or nursing home No   Do you live in a long-term care facility, nursing home, or homeless shelter? No   List what you have done or taken to help your symptoms. Tylenol   How severe are your symptoms? Moderate   Have you taken an at home Covid test? Yes   What were the results? Positive   Have you been fully vaccinated for COVID? (2 Pfizer, 2 Moderna or 1 Bi & Bi vaccine injections) No   Have you received your first dose of the Pfizer or Moderna COVID vaccine? No   Do you have transportation to get tested for COVID if it is indicated and ordered for you at an [facility] location? Yes   Provide any information you feel is important to your  history not asked above    Please attach any relevant images or files         Active Problem List with Overview Notes    Diagnosis Date Noted    Meningioma 03/25/2022    Reversible cerebrovascular vasoconstriction syndrome 03/25/2022             Abnormal brain CT 03/24/2022    Essential hypertension, benign 03/15/2022    History of gestational diabetes 08/05/2021      Recent Labs Obtained:  No visits with results within 7 Day(s) from this visit.   Latest known visit with results is:   Clinical Support on 08/12/2022   Component Date Value Ref Range Status    POC Preg Test, Ur 08/12/2022 Negative  Negative Final     Acceptable 08/12/2022 Yes   Final       Encounter Diagnosis   Name Primary?    COVID-19 Yes      1   No orders of the defined types were placed in this encounter.     Medications Ordered This Encounter   Medications    guaiFENesin-codeine 100-10 mg/5 ml (TUSSI-ORGANIDIN NR)  mg/5 mL syrup     Sig: Take 5 mLs by mouth every 6 (six) hours as needed for Cough.     Dispense:  120 mL     Refill:  0     Order Specific Question:   I have reviewed the Prescription Drug Monitoring Program (PDMP) database for this patient prior to prescribing the above opioid medication     Answer:   Yes        E-Visit Time Tracking:    Day 1 Time (in minutes): 5     Total Time (in minutes): 5

## 2022-09-14 ENCOUNTER — OFFICE VISIT (OUTPATIENT)
Dept: PRIMARY CARE CLINIC | Facility: CLINIC | Age: 37
End: 2022-09-14
Payer: COMMERCIAL

## 2022-09-14 VITALS
BODY MASS INDEX: 18.5 KG/M2 | OXYGEN SATURATION: 99 % | RESPIRATION RATE: 18 BRPM | HEIGHT: 62 IN | TEMPERATURE: 98 F | HEART RATE: 81 BPM | WEIGHT: 100.5 LBS | DIASTOLIC BLOOD PRESSURE: 80 MMHG | SYSTOLIC BLOOD PRESSURE: 122 MMHG

## 2022-09-14 DIAGNOSIS — Z86.16 HISTORY OF COVID-19: ICD-10-CM

## 2022-09-14 DIAGNOSIS — D32.9 MENINGIOMA: ICD-10-CM

## 2022-09-14 DIAGNOSIS — I10 ESSENTIAL HYPERTENSION, BENIGN: Primary | ICD-10-CM

## 2022-09-14 DIAGNOSIS — Z23 NEED FOR VACCINATION: ICD-10-CM

## 2022-09-14 PROCEDURE — 3079F DIAST BP 80-89 MM HG: CPT | Mod: CPTII,S$GLB,, | Performed by: FAMILY MEDICINE

## 2022-09-14 PROCEDURE — 3008F BODY MASS INDEX DOCD: CPT | Mod: CPTII,S$GLB,, | Performed by: FAMILY MEDICINE

## 2022-09-14 PROCEDURE — 99999 PR PBB SHADOW E&M-EST. PATIENT-LVL IV: ICD-10-PCS | Mod: PBBFAC,,, | Performed by: FAMILY MEDICINE

## 2022-09-14 PROCEDURE — 3044F PR MOST RECENT HEMOGLOBIN A1C LEVEL <7.0%: ICD-10-PCS | Mod: CPTII,S$GLB,, | Performed by: FAMILY MEDICINE

## 2022-09-14 PROCEDURE — 3008F PR BODY MASS INDEX (BMI) DOCUMENTED: ICD-10-PCS | Mod: CPTII,S$GLB,, | Performed by: FAMILY MEDICINE

## 2022-09-14 PROCEDURE — 1159F MED LIST DOCD IN RCRD: CPT | Mod: CPTII,S$GLB,, | Performed by: FAMILY MEDICINE

## 2022-09-14 PROCEDURE — 1159F PR MEDICATION LIST DOCUMENTED IN MEDICAL RECORD: ICD-10-PCS | Mod: CPTII,S$GLB,, | Performed by: FAMILY MEDICINE

## 2022-09-14 PROCEDURE — 1160F RVW MEDS BY RX/DR IN RCRD: CPT | Mod: CPTII,S$GLB,, | Performed by: FAMILY MEDICINE

## 2022-09-14 PROCEDURE — 1160F PR REVIEW ALL MEDS BY PRESCRIBER/CLIN PHARMACIST DOCUMENTED: ICD-10-PCS | Mod: CPTII,S$GLB,, | Performed by: FAMILY MEDICINE

## 2022-09-14 PROCEDURE — 4010F PR ACE/ARB THEARPY RXD/TAKEN: ICD-10-PCS | Mod: CPTII,S$GLB,, | Performed by: FAMILY MEDICINE

## 2022-09-14 PROCEDURE — 3079F PR MOST RECENT DIASTOLIC BLOOD PRESSURE 80-89 MM HG: ICD-10-PCS | Mod: CPTII,S$GLB,, | Performed by: FAMILY MEDICINE

## 2022-09-14 PROCEDURE — 99213 OFFICE O/P EST LOW 20 MIN: CPT | Mod: S$GLB,,, | Performed by: FAMILY MEDICINE

## 2022-09-14 PROCEDURE — 4010F ACE/ARB THERAPY RXD/TAKEN: CPT | Mod: CPTII,S$GLB,, | Performed by: FAMILY MEDICINE

## 2022-09-14 PROCEDURE — 99213 PR OFFICE/OUTPT VISIT, EST, LEVL III, 20-29 MIN: ICD-10-PCS | Mod: S$GLB,,, | Performed by: FAMILY MEDICINE

## 2022-09-14 PROCEDURE — 3044F HG A1C LEVEL LT 7.0%: CPT | Mod: CPTII,S$GLB,, | Performed by: FAMILY MEDICINE

## 2022-09-14 PROCEDURE — 99999 PR PBB SHADOW E&M-EST. PATIENT-LVL IV: CPT | Mod: PBBFAC,,, | Performed by: FAMILY MEDICINE

## 2022-09-14 PROCEDURE — 3074F PR MOST RECENT SYSTOLIC BLOOD PRESSURE < 130 MM HG: ICD-10-PCS | Mod: CPTII,S$GLB,, | Performed by: FAMILY MEDICINE

## 2022-09-14 PROCEDURE — 3074F SYST BP LT 130 MM HG: CPT | Mod: CPTII,S$GLB,, | Performed by: FAMILY MEDICINE

## 2022-09-14 RX ORDER — LABETALOL 200 MG/1
200 TABLET, FILM COATED ORAL 2 TIMES DAILY
COMMUNITY
Start: 2022-08-07 | End: 2023-03-27

## 2022-09-14 NOTE — PROGRESS NOTES
"Subjective:       Patient ID: Ce Ag is a 37 y.o. female.    Chief Complaint: Follow-up (6 month f/u)    Here for follow-up visit.  Had COVID about 2 weeks ago, slowly improving.  Still has some residual cough and shortness of breath, but feels like she is getting better.  No fevers or chills.  Blood pressure has been well controlled on home monitoring.  Was referred to a cardiologist by Neurology earlier this year, added labetalol to the low-dose amlodipine that she was already taking.  At follow-up, her blood pressure was much improved, and amlodipine was discontinued.  Blood pressure has remained stable on home monitoring on labetalol monotherapy.  Denies headaches, blurry vision, chest pain, palpitations or shortness of breath.    Review of Systems   Constitutional:  Negative for chills, fatigue and fever.   HENT:  Negative for congestion.    Eyes:  Negative for visual disturbance.   Respiratory:  Positive for shortness of breath (improving since COVID).    Cardiovascular:  Negative for chest pain.   Gastrointestinal:  Negative for abdominal pain, nausea and vomiting.   Genitourinary:  Negative for difficulty urinating.   Musculoskeletal:  Negative for arthralgias.   Skin:  Negative for rash.   Neurological:  Negative for dizziness and headaches.   Psychiatric/Behavioral:  Negative for sleep disturbance.      Objective:      Vitals:    09/14/22 0954   BP: 122/80   BP Location: Left arm   Patient Position: Sitting   BP Method: Medium (Manual)   Pulse: 81   Resp: 18   Temp: 98 °F (36.7 °C)   TempSrc: Temporal   SpO2: 99%   Weight: 45.6 kg (100 lb 8.5 oz)   Height: 5' 2" (1.575 m)     Physical Exam  Vitals and nursing note reviewed.   Constitutional:       General: She is not in acute distress.     Appearance: Normal appearance. She is well-developed.   HENT:      Head: Normocephalic and atraumatic.   Cardiovascular:      Rate and Rhythm: Normal rate and regular rhythm.      Heart sounds: Normal heart " sounds.   Pulmonary:      Effort: Pulmonary effort is normal.      Breath sounds: Normal breath sounds.   Musculoskeletal:      Right lower leg: No edema.      Left lower leg: No edema.   Skin:     General: Skin is warm and dry.   Neurological:      Mental Status: She is alert and oriented to person, place, and time.   Psychiatric:         Mood and Affect: Mood normal.         Behavior: Behavior normal.       Lab Results   Component Value Date    WBC 5.48 03/25/2022    HGB 10.8 (L) 03/25/2022    HCT 31.6 (L) 03/25/2022     03/25/2022    CHOL 155 03/16/2022    TRIG 95 03/16/2022    HDL 53 03/16/2022    ALT 14 03/25/2022    AST 16 03/25/2022     03/25/2022    K 3.9 03/25/2022     (H) 03/25/2022    CREATININE 0.7 03/25/2022    BUN 9 03/25/2022    CO2 21 (L) 03/25/2022    TSH 1.034 03/25/2022    INR 1.0 03/25/2022    HGBA1C 5.1 03/25/2022      Assessment:       1. Essential hypertension, benign    2. History of COVID-19    3. Need for vaccination    4. Meningioma          Plan:       Essential hypertension, benign  Well controlled on current regimen.  Continue to monitor at home  History of COVID-19  Improving  Need for vaccination  Declines vaccinations at this time  Meningioma  Stable, followed regularly by Neurosurgery    Medication List with Changes/Refills   Current Medications    LABETALOL (NORMODYNE) 200 MG TABLET    Take 200 mg by mouth 2 (two) times daily.   Discontinued Medications    AMLODIPINE (NORVASC) 2.5 MG TABLET    Take 1 tablet (2.5 mg total) by mouth once daily.    GUAIFENESIN-CODEINE 100-10 MG/5 ML (TUSSI-ORGANIDIN NR)  MG/5 ML SYRUP    Take 5 mLs by mouth every 6 (six) hours as needed for Cough.

## 2022-09-27 ENCOUNTER — PATIENT MESSAGE (OUTPATIENT)
Dept: PRIMARY CARE CLINIC | Facility: CLINIC | Age: 37
End: 2022-09-27
Payer: COMMERCIAL

## 2022-11-11 ENCOUNTER — CLINICAL SUPPORT (OUTPATIENT)
Dept: PRIMARY CARE CLINIC | Facility: CLINIC | Age: 37
End: 2022-11-11
Payer: COMMERCIAL

## 2022-11-11 DIAGNOSIS — Z30.9 ENCOUNTER FOR CONTRACEPTIVE MANAGEMENT, UNSPECIFIED TYPE: Primary | ICD-10-CM

## 2022-11-11 LAB
B-HCG UR QL: NEGATIVE
CTP QC/QA: YES

## 2022-11-11 PROCEDURE — 96372 THER/PROPH/DIAG INJ SC/IM: CPT | Mod: S$GLB,,, | Performed by: FAMILY MEDICINE

## 2022-11-11 PROCEDURE — 96372 PR INJECTION,THERAP/PROPH/DIAG2ST, IM OR SUBCUT: ICD-10-PCS | Mod: S$GLB,,, | Performed by: FAMILY MEDICINE

## 2022-11-11 PROCEDURE — 81025 POCT URINE PREGNANCY: ICD-10-PCS | Mod: S$GLB,,, | Performed by: FAMILY MEDICINE

## 2022-11-11 PROCEDURE — 81025 URINE PREGNANCY TEST: CPT | Mod: S$GLB,,, | Performed by: FAMILY MEDICINE

## 2022-11-11 RX ADMIN — MEDROXYPROGESTERONE ACETATE 150 MG: 150 INJECTION, SUSPENSION INTRAMUSCULAR at 10:11

## 2022-11-11 NOTE — PROGRESS NOTES
Verified pt ID using name and . Depo provera 150mg Administered IM in left glut per physician order using aseptic technique. Aspirated and no blood return noted. Pt tolerated well with no adverse reactions noted.

## 2023-02-14 ENCOUNTER — TELEPHONE (OUTPATIENT)
Dept: NEUROSURGERY | Facility: CLINIC | Age: 38
End: 2023-02-14
Payer: COMMERCIAL

## 2023-02-14 DIAGNOSIS — G93.9 BRAIN LESION: Primary | ICD-10-CM

## 2023-02-14 DIAGNOSIS — R51.9 NONINTRACTABLE HEADACHE, UNSPECIFIED CHRONICITY PATTERN, UNSPECIFIED HEADACHE TYPE: ICD-10-CM

## 2023-03-27 ENCOUNTER — OFFICE VISIT (OUTPATIENT)
Dept: NEUROSURGERY | Facility: CLINIC | Age: 38
End: 2023-03-27
Payer: COMMERCIAL

## 2023-03-27 ENCOUNTER — HOSPITAL ENCOUNTER (OUTPATIENT)
Dept: RADIOLOGY | Facility: HOSPITAL | Age: 38
Discharge: HOME | End: 2023-03-27
Attending: NEUROLOGICAL SURGERY
Payer: COMMERCIAL

## 2023-03-27 VITALS
HEIGHT: 62 IN | DIASTOLIC BLOOD PRESSURE: 71 MMHG | SYSTOLIC BLOOD PRESSURE: 123 MMHG | BODY MASS INDEX: 18.4 KG/M2 | WEIGHT: 100 LBS | HEART RATE: 85 BPM

## 2023-03-27 DIAGNOSIS — G93.9 BRAIN LESION: ICD-10-CM

## 2023-03-27 DIAGNOSIS — D32.9 MENINGIOMA: Primary | ICD-10-CM

## 2023-03-27 DIAGNOSIS — R51.9 NONINTRACTABLE HEADACHE, UNSPECIFIED CHRONICITY PATTERN, UNSPECIFIED HEADACHE TYPE: ICD-10-CM

## 2023-03-27 PROCEDURE — 1160F PR REVIEW ALL MEDS BY PRESCRIBER/CLIN PHARMACIST DOCUMENTED: ICD-10-PCS | Mod: CPTII,S$GLB,, | Performed by: NEUROLOGICAL SURGERY

## 2023-03-27 PROCEDURE — 3008F PR BODY MASS INDEX (BMI) DOCUMENTED: ICD-10-PCS | Mod: CPTII,S$GLB,, | Performed by: NEUROLOGICAL SURGERY

## 2023-03-27 PROCEDURE — A9585 GADOBUTROL INJECTION: HCPCS | Performed by: NEUROLOGICAL SURGERY

## 2023-03-27 PROCEDURE — 3078F PR MOST RECENT DIASTOLIC BLOOD PRESSURE < 80 MM HG: ICD-10-PCS | Mod: CPTII,S$GLB,, | Performed by: NEUROLOGICAL SURGERY

## 2023-03-27 PROCEDURE — 99999 PR PBB SHADOW E&M-EST. PATIENT-LVL III: CPT | Mod: PBBFAC,,, | Performed by: NEUROLOGICAL SURGERY

## 2023-03-27 PROCEDURE — 99999 PR PBB SHADOW E&M-EST. PATIENT-LVL III: ICD-10-PCS | Mod: PBBFAC,,, | Performed by: NEUROLOGICAL SURGERY

## 2023-03-27 PROCEDURE — 99213 PR OFFICE/OUTPT VISIT, EST, LEVL III, 20-29 MIN: ICD-10-PCS | Mod: S$GLB,,, | Performed by: NEUROLOGICAL SURGERY

## 2023-03-27 PROCEDURE — 3078F DIAST BP <80 MM HG: CPT | Mod: CPTII,S$GLB,, | Performed by: NEUROLOGICAL SURGERY

## 2023-03-27 PROCEDURE — 1160F RVW MEDS BY RX/DR IN RCRD: CPT | Mod: CPTII,S$GLB,, | Performed by: NEUROLOGICAL SURGERY

## 2023-03-27 PROCEDURE — 99213 OFFICE O/P EST LOW 20 MIN: CPT | Mod: S$GLB,,, | Performed by: NEUROLOGICAL SURGERY

## 2023-03-27 PROCEDURE — 1159F PR MEDICATION LIST DOCUMENTED IN MEDICAL RECORD: ICD-10-PCS | Mod: CPTII,S$GLB,, | Performed by: NEUROLOGICAL SURGERY

## 2023-03-27 PROCEDURE — 70553 MRI BRAIN W WO CONTRAST: ICD-10-PCS | Mod: 26,,, | Performed by: RADIOLOGY

## 2023-03-27 PROCEDURE — 1159F MED LIST DOCD IN RCRD: CPT | Mod: CPTII,S$GLB,, | Performed by: NEUROLOGICAL SURGERY

## 2023-03-27 PROCEDURE — 25500020 PHARM REV CODE 255: Performed by: NEUROLOGICAL SURGERY

## 2023-03-27 PROCEDURE — 70553 MRI BRAIN STEM W/O & W/DYE: CPT | Mod: 26,,, | Performed by: RADIOLOGY

## 2023-03-27 PROCEDURE — 3074F PR MOST RECENT SYSTOLIC BLOOD PRESSURE < 130 MM HG: ICD-10-PCS | Mod: CPTII,S$GLB,, | Performed by: NEUROLOGICAL SURGERY

## 2023-03-27 PROCEDURE — 70553 MRI BRAIN STEM W/O & W/DYE: CPT | Mod: TC

## 2023-03-27 PROCEDURE — 3074F SYST BP LT 130 MM HG: CPT | Mod: CPTII,S$GLB,, | Performed by: NEUROLOGICAL SURGERY

## 2023-03-27 PROCEDURE — 3008F BODY MASS INDEX DOCD: CPT | Mod: CPTII,S$GLB,, | Performed by: NEUROLOGICAL SURGERY

## 2023-03-27 RX ORDER — GADOBUTROL 604.72 MG/ML
5 INJECTION INTRAVENOUS
Status: COMPLETED | OUTPATIENT
Start: 2023-03-27 | End: 2023-03-27

## 2023-03-27 RX ORDER — LABETALOL 100 MG/1
100 TABLET, FILM COATED ORAL 2 TIMES DAILY
COMMUNITY
Start: 2023-03-22

## 2023-03-27 RX ADMIN — GADOBUTROL 5 ML: 604.72 INJECTION INTRAVENOUS at 10:03

## 2023-03-27 NOTE — PROGRESS NOTES
Ce Ag was seen in neurosurgical follow-up at the office today.  She is a 37-year-old lady who presented a year ago with sudden severe headache and some blurred vision.  CT scan suggested a possible tiny falcine hemorrhage but MRI was more consistent with a meningioma.  It was felt this could be followed up.  She has done well neurologically over the past year with no new symptoms.  She has occasional headache these tend to be more frontal.  She has noted no visual loss, speech difficulty or other neurological symptoms.  She did have a mild case of COVID last fall but no sequelae from this.    On brief examination today she is bright alert and speaking clearly.  Extraocular movements are good and pupils equal.  She stood and walked without difficulty and seems generally neurologically intact.      MRI of the brain was repeated at [facility] today and compared to her last study of 03/25/2022.  Again is seen a small 9-10 mm enhancement along the falx somewhat more to the right side in the frontal area.  There has been no growth or change in the shape of this.  SWI images show this to be calcified.  This is most consistent with a small meningioma.    I am happy to see her doing well.  Considering her young age this can be followed up in a couple of years with MRI to see if there is any growth of the lesion.

## 2023-05-12 ENCOUNTER — CLINICAL SUPPORT (OUTPATIENT)
Dept: PRIMARY CARE CLINIC | Facility: CLINIC | Age: 38
End: 2023-05-12
Payer: COMMERCIAL

## 2023-05-12 DIAGNOSIS — Z30.42 ENCOUNTER FOR MANAGEMENT AND INJECTION OF INJECTABLE PROGESTIN CONTRACEPTIVE: Primary | ICD-10-CM

## 2023-05-12 PROCEDURE — 96372 PR INJECTION,THERAP/PROPH/DIAG2ST, IM OR SUBCUT: ICD-10-PCS | Mod: S$GLB,,, | Performed by: FAMILY MEDICINE

## 2023-05-12 PROCEDURE — 99999 PR PBB SHADOW E&M-EST. PATIENT-LVL II: ICD-10-PCS | Mod: PBBFAC,,,

## 2023-05-12 PROCEDURE — 99999 PR PBB SHADOW E&M-EST. PATIENT-LVL II: CPT | Mod: PBBFAC,,,

## 2023-05-12 PROCEDURE — 96372 THER/PROPH/DIAG INJ SC/IM: CPT | Mod: S$GLB,,, | Performed by: FAMILY MEDICINE

## 2023-05-12 RX ADMIN — MEDROXYPROGESTERONE ACETATE 150 MG: 150 INJECTION, SUSPENSION INTRAMUSCULAR at 09:05

## 2023-05-12 NOTE — PROGRESS NOTES
Patient identity was verified using name and . Allergies were reviewed. Administered Depo-Provera into left dorsalgluteal using aseptic technique. No apparent reaction noted. Patient tolerated well.

## 2023-06-05 LAB — PAP RECOMMENDATION EXT: NORMAL

## 2023-06-22 DIAGNOSIS — I10 ESSENTIAL HYPERTENSION, BENIGN: ICD-10-CM

## 2023-06-27 ENCOUNTER — PATIENT MESSAGE (OUTPATIENT)
Dept: ADMINISTRATIVE | Facility: HOSPITAL | Age: 38
End: 2023-06-27
Payer: COMMERCIAL

## 2023-08-11 ENCOUNTER — CLINICAL SUPPORT (OUTPATIENT)
Dept: PRIMARY CARE CLINIC | Facility: CLINIC | Age: 38
End: 2023-08-11
Payer: COMMERCIAL

## 2023-08-11 DIAGNOSIS — Z30.42 ENCOUNTER FOR MANAGEMENT AND INJECTION OF INJECTABLE PROGESTIN CONTRACEPTIVE: Primary | ICD-10-CM

## 2023-08-11 PROCEDURE — 96372 PR INJECTION,THERAP/PROPH/DIAG2ST, IM OR SUBCUT: ICD-10-PCS | Mod: S$GLB,,, | Performed by: FAMILY MEDICINE

## 2023-08-11 PROCEDURE — 99999 PR PBB SHADOW E&M-EST. PATIENT-LVL I: ICD-10-PCS | Mod: PBBFAC,,,

## 2023-08-11 PROCEDURE — 99999 PR PBB SHADOW E&M-EST. PATIENT-LVL I: CPT | Mod: PBBFAC,,,

## 2023-08-11 PROCEDURE — 96372 THER/PROPH/DIAG INJ SC/IM: CPT | Mod: S$GLB,,, | Performed by: FAMILY MEDICINE

## 2023-08-11 RX ADMIN — MEDROXYPROGESTERONE ACETATE 150 MG: 150 INJECTION, SUSPENSION INTRAMUSCULAR at 10:08

## 2023-08-11 NOTE — PROGRESS NOTES
Verified pt by name and . NKDA. Per physician orders pt was administered MedroxyPROGESTERone acetate injection  mg/mL to left upper outer gluteus using aseptic technique. Pt tolerated well. No adverse effects or pain reported. MD notified.

## 2023-10-10 ENCOUNTER — PATIENT MESSAGE (OUTPATIENT)
Dept: PRIMARY CARE CLINIC | Facility: CLINIC | Age: 38
End: 2023-10-10
Payer: COMMERCIAL

## 2023-11-07 ENCOUNTER — TELEPHONE (OUTPATIENT)
Dept: PRIMARY CARE CLINIC | Facility: CLINIC | Age: 38
End: 2023-11-07
Payer: COMMERCIAL

## 2023-11-07 NOTE — TELEPHONE ENCOUNTER
----- Message from Liz Lopez sent at 11/7/2023  8:54 AM CST -----  Contact: [phone]  Pt is requesting to get her depo shot scheduled this week. Please advise.

## 2023-11-14 ENCOUNTER — CLINICAL SUPPORT (OUTPATIENT)
Dept: PRIMARY CARE CLINIC | Facility: CLINIC | Age: 38
End: 2023-11-14
Payer: COMMERCIAL

## 2023-11-14 DIAGNOSIS — Z30.42 ENCOUNTER FOR MANAGEMENT AND INJECTION OF INJECTABLE PROGESTIN CONTRACEPTIVE: Primary | ICD-10-CM

## 2023-11-14 PROCEDURE — 96372 PR INJECTION,THERAP/PROPH/DIAG2ST, IM OR SUBCUT: ICD-10-PCS | Mod: S$GLB,,, | Performed by: FAMILY MEDICINE

## 2023-11-14 PROCEDURE — 96372 THER/PROPH/DIAG INJ SC/IM: CPT | Mod: S$GLB,,, | Performed by: FAMILY MEDICINE

## 2023-11-14 RX ADMIN — MEDROXYPROGESTERONE ACETATE 150 MG: 150 INJECTION, SUSPENSION INTRAMUSCULAR at 09:11

## 2023-11-14 NOTE — PROGRESS NOTES
Verified pt ID using name and . NKDA. Administered Depo Provera 1ML in left VG per physician order using aseptic technique. Aspirated and no blood return noted. Pt tolerated well with no adverse reactions noted.

## 2024-02-06 ENCOUNTER — TELEPHONE (OUTPATIENT)
Dept: PRIMARY CARE CLINIC | Facility: CLINIC | Age: 39
End: 2024-02-06
Payer: COMMERCIAL

## 2024-02-06 NOTE — TELEPHONE ENCOUNTER
----- Message from Jane Black sent at 2/6/2024 10:57 AM CST -----  Contact: Pt [phone]  Patient is calling to schedule a Nurse visit for her Depo Shot.    Please call and advise.    Thank You

## 2024-02-16 ENCOUNTER — CLINICAL SUPPORT (OUTPATIENT)
Dept: PRIMARY CARE CLINIC | Facility: CLINIC | Age: 39
End: 2024-02-16
Payer: COMMERCIAL

## 2024-02-16 DIAGNOSIS — Z30.42 ENCOUNTER FOR MANAGEMENT AND INJECTION OF INJECTABLE PROGESTIN CONTRACEPTIVE: Primary | ICD-10-CM

## 2024-02-16 PROCEDURE — 96372 THER/PROPH/DIAG INJ SC/IM: CPT | Mod: S$GLB,,, | Performed by: FAMILY MEDICINE

## 2024-02-16 PROCEDURE — 99999 PR PBB SHADOW E&M-EST. PATIENT-LVL I: CPT | Mod: PBBFAC,,,

## 2024-02-16 RX ADMIN — MEDROXYPROGESTERONE ACETATE 150 MG: 150 INJECTION, SUSPENSION INTRAMUSCULAR at 10:02

## 2024-02-16 NOTE — PROGRESS NOTES
Verified pt ID using name and . BAKARI Viera. Administered Depro Provera in Left Dorsal Gluteal per physician order using aseptic technique. Aspirated and no blood return noted. Pt tolerated well with no adverse reactions noted.

## 2024-05-24 ENCOUNTER — CLINICAL SUPPORT (OUTPATIENT)
Dept: PRIMARY CARE CLINIC | Facility: CLINIC | Age: 39
End: 2024-05-24
Payer: COMMERCIAL

## 2024-05-24 DIAGNOSIS — Z30.9 ENCOUNTER FOR CONTRACEPTIVE MANAGEMENT, UNSPECIFIED TYPE: Primary | ICD-10-CM

## 2024-05-24 PROCEDURE — 99999 PR PBB SHADOW E&M-EST. PATIENT-LVL II: CPT | Mod: PBBFAC,,,

## 2024-05-24 NOTE — PROGRESS NOTES
Verified pt ID using name and . Lauren Reyes, LPN. Administered Depo Provera 150 mg in right dorsal gluteal per physician order using aseptic technique. Aspirated and no blood return noted. Pt tolerated well with no adverse reactions noted.

## 2024-05-27 ENCOUNTER — PATIENT OUTREACH (OUTPATIENT)
Dept: ADMINISTRATIVE | Facility: HOSPITAL | Age: 39
End: 2024-05-27
Payer: COMMERCIAL

## 2024-05-27 NOTE — PROGRESS NOTES
Health Maintenance Due   Topic Date Due    TETANUS VACCINE  Never done    COVID-19 Vaccine (1 - 2023-24 season) Never done     Immunizations - reviewed and updated   Care Everywhere - triggered   Care Teams -   Outreach -  [facility] panel list reviewed. Patient due for annual visit with PCP. Patient last seen on 9/14/2022. Portal message sent in regards to scheduling  [facility] reviewed. Pap/HPV screening done 1/18/2022 and Pap done 6/5/2023, uploaded to .  updated

## 2024-07-02 ENCOUNTER — OFFICE VISIT (OUTPATIENT)
Dept: PRIMARY CARE CLINIC | Facility: CLINIC | Age: 39
End: 2024-07-02
Payer: COMMERCIAL

## 2024-07-02 VITALS
DIASTOLIC BLOOD PRESSURE: 75 MMHG | SYSTOLIC BLOOD PRESSURE: 125 MMHG | HEART RATE: 91 BPM | BODY MASS INDEX: 19.57 KG/M2 | OXYGEN SATURATION: 100 % | WEIGHT: 106.38 LBS | HEIGHT: 62 IN | RESPIRATION RATE: 18 BRPM | TEMPERATURE: 98 F

## 2024-07-02 DIAGNOSIS — I10 ESSENTIAL HYPERTENSION, BENIGN: ICD-10-CM

## 2024-07-02 DIAGNOSIS — Z86.32 HISTORY OF GESTATIONAL DIABETES: ICD-10-CM

## 2024-07-02 DIAGNOSIS — Z23 NEED FOR VACCINATION: ICD-10-CM

## 2024-07-02 DIAGNOSIS — Z00.00 ANNUAL PHYSICAL EXAM: Primary | ICD-10-CM

## 2024-07-02 PROCEDURE — 99999 PR PBB SHADOW E&M-EST. PATIENT-LVL IV: CPT | Mod: PBBFAC,,, | Performed by: FAMILY MEDICINE

## 2024-07-02 RX ORDER — LABETALOL 100 MG/1
50 TABLET, FILM COATED ORAL 2 TIMES DAILY
Qty: 90 TABLET | Refills: 3 | Status: SHIPPED | OUTPATIENT
Start: 2024-07-02

## 2024-07-02 NOTE — PROGRESS NOTES
"Subjective:       Patient ID: Ce Ag is a 39 y.o. female.    Chief Complaint: Annual Exam (Pt did not take BP meds)    Ce Ag is a 39 y.o. female seen today for a routine checkup. The patient has no specific complaints or concerns at this time.  No recent illness or injury.  Compliant with all prescribed medications without adverse side effects. BP good on home monitoring, has been taking labetalol 100mg daily      Review of Systems   Constitutional:  Negative for chills, fatigue and fever.   HENT:  Negative for congestion.    Eyes:  Negative for visual disturbance.   Respiratory:  Negative for cough and shortness of breath.    Cardiovascular:  Negative for chest pain.   Gastrointestinal:  Negative for abdominal pain, nausea and vomiting.   Genitourinary:  Negative for difficulty urinating.   Musculoskeletal:  Negative for arthralgias.   Skin:  Negative for rash.   Neurological:  Negative for dizziness.   Psychiatric/Behavioral:  Negative for sleep disturbance.        Objective:      Vitals:    07/02/24 1010 07/02/24 1040   BP: (!) 152/88 125/75   BP Location: Right arm    Patient Position: Sitting    BP Method: Medium (Manual)    Pulse: 91    Resp: 18    Temp: 98 °F (36.7 °C)    TempSrc: Temporal    SpO2: 100%    Weight: 48.2 kg (106 lb 6 oz)    Height: 5' 2" (1.575 m)      BP Readings from Last 5 Encounters:   07/02/24 125/75   03/27/23 123/71   09/14/22 122/80   04/06/22 125/75   03/25/22 (!) 152/72     Wt Readings from Last 5 Encounters:   07/02/24 48.2 kg (106 lb 6 oz)   03/27/23 45.4 kg (100 lb)   09/14/22 45.6 kg (100 lb 8.5 oz)   04/06/22 42.3 kg (93 lb 4.1 oz)   03/25/22 42.7 kg (94 lb 1.5 oz)     Physical Exam  Vitals and nursing note reviewed.   Constitutional:       General: She is not in acute distress.     Appearance: Normal appearance. She is well-developed.   HENT:      Head: Normocephalic and atraumatic.   Neck:      Vascular: No carotid bruit.   Cardiovascular:      Rate " and Rhythm: Normal rate and regular rhythm.      Heart sounds: Normal heart sounds.   Pulmonary:      Effort: Pulmonary effort is normal.      Breath sounds: Normal breath sounds.   Abdominal:      General: There is no distension.      Tenderness: There is no abdominal tenderness.   Musculoskeletal:      Right lower leg: No edema.      Left lower leg: No edema.   Skin:     General: Skin is warm and dry.   Neurological:      Mental Status: She is alert and oriented to person, place, and time.   Psychiatric:         Mood and Affect: Mood normal.         Behavior: Behavior normal.         Lab Results   Component Value Date    WBC 5.48 03/25/2022    HGB 10.8 (L) 03/25/2022    HCT 31.6 (L) 03/25/2022     03/25/2022    CHOL 155 03/16/2022    TRIG 95 03/16/2022    HDL 53 03/16/2022    ALT 14 03/25/2022    AST 16 03/25/2022     03/25/2022    K 3.9 03/25/2022     (H) 03/25/2022    CREATININE 0.7 03/25/2022    BUN 9 03/25/2022    CO2 21 (L) 03/25/2022    TSH 1.034 03/25/2022    INR 1.0 03/25/2022    HGBA1C 5.1 03/25/2022      Assessment:       1. Annual physical exam    2. History of gestational diabetes    3. Essential hypertension, benign    4. Need for vaccination        Plan:       Annual physical exam  -     CBC Auto Differential; Future; Expected date: 07/02/2024  -     Comprehensive Metabolic Panel; Future; Expected date: 07/02/2024  -     Lipid Panel; Future; Expected date: 07/02/2024  -     Hemoglobin A1C; Future; Expected date: 07/02/2024  -     TSH; Future; Expected date: 07/02/2024    History of gestational diabetes  -     Hemoglobin A1C; Future; Expected date: 07/02/2024    Essential hypertension, benign  -     labetaloL (NORMODYNE) 100 MG tablet; Take 0.5 tablets (50 mg total) by mouth 2 (two) times daily.  Dispense: 90 tablet; Refill: 3  Adjust meds to BID  Need for vaccination  -     DIPH,PERTUSS(ACEL),TET VAC(PF)(ADULT)(ADACEL)(TDaP)      Medication List with Changes/Refills   Changed  and/or Refilled Medications    Modified Medication Previous Medication    LABETALOL (NORMODYNE) 100 MG TABLET labetaloL (NORMODYNE) 100 MG tablet       Take 0.5 tablets (50 mg total) by mouth 2 (two) times daily.    Take 100 mg by mouth 2 (two) times daily.

## 2024-08-22 NOTE — HPI
Ce Ag is a 36 year old female with a medical history significant for HTN who presented to OSH on 3/24/2022 for evaluation and management of a severe frontal headache with associated blurred vision and chest pain x1 hour. Per chart, patient was given 1000mg of tylenol with resolution of symptoms. CTH at OSH showed a small frontal extra-axial hyperintensity with concern for SAH vs calcification. CTA unremarkable for aneurysm/vascular malformation. [place] [facility] [place] On arrival, patient was neuro intact and without acute neurologic deficit, headache or vision change.     MRI Brain to further eval potential hemorrhage, results confirm small hemorrhage. Admitted to r/o [facility].    S:  Patient here for instill #3 given hx of bladder pain.  Patient reports feeling minimal improvement from recent instill  Past instills have been helpful  Denies gross hematuria  Denies  s/sx of UTI  Bowels constipated   Using Linzess  Also using Azo for bladder pain    O:  VS There were no vitals filed for this visit.  Gen: NAD  :   Ext. Gen: + atrophy, no lesions  Urethra: no caruncle, no atrophy  Vaginal discharge-none  Perineum: intact, non tender  Instill given per clinic protocol.   Patient catheterized for a residual of 5ml.      A/P:  Dx   Encounter Diagnosis   Name Primary?    Chronic interstitial cystitis Yes     Chemstrip performed.    Continue estradiol cream  Reviewed bowel regimen  Reviewed instill instructions  Patient tolerated procedure well.    Will need f/u w/ Heike GREGORIO in December  Next instill scheduled for next week

## 2024-08-23 ENCOUNTER — TELEPHONE (OUTPATIENT)
Dept: PRIMARY CARE CLINIC | Facility: CLINIC | Age: 39
End: 2024-08-23
Payer: COMMERCIAL

## 2024-08-23 NOTE — TELEPHONE ENCOUNTER
----- Message from Natasha Workman sent at 8/23/2024  9:27 AM CDT -----  Contact: [phone]  [email or web address]     Patient is calling for Medical Advice regarding:Patient cannot come in today for her appointment and would like to r/s for next Friday, please call to r/s.  How long has patient had these symptoms:

## 2024-08-30 ENCOUNTER — TELEPHONE (OUTPATIENT)
Dept: PRIMARY CARE CLINIC | Facility: CLINIC | Age: 39
End: 2024-08-30
Payer: COMMERCIAL

## 2024-08-30 NOTE — TELEPHONE ENCOUNTER
----- Message from Mikayla Baldwin sent at 8/30/2024 10:36 AM CDT -----  Contact: Patient, [phone]  Calling to reschedule her nurse visit for her depo injection. Please call her. Thanks.

## 2024-09-06 ENCOUNTER — CLINICAL SUPPORT (OUTPATIENT)
Dept: PRIMARY CARE CLINIC | Facility: CLINIC | Age: 39
End: 2024-09-06
Payer: COMMERCIAL

## 2024-09-06 DIAGNOSIS — Z30.9 ENCOUNTER FOR CONTRACEPTIVE MANAGEMENT, UNSPECIFIED TYPE: Primary | ICD-10-CM

## 2024-09-06 RX ADMIN — MEDROXYPROGESTERONE ACETATE 150 MG: 150 INJECTION, SUSPENSION INTRAMUSCULAR at 10:09

## 2024-09-06 NOTE — PROGRESS NOTES
Verified pt ID using name and . Jessica George LPN. Administered 150 mg in right dorsogluteal per physician order using aseptic technique. Aspirated and no blood return noted. Pt tolerated well with no adverse reactions noted.

## 2024-09-19 ENCOUNTER — PATIENT MESSAGE (OUTPATIENT)
Dept: PRIMARY CARE CLINIC | Facility: CLINIC | Age: 39
End: 2024-09-19
Payer: COMMERCIAL

## 2024-11-14 ENCOUNTER — OFFICE VISIT (OUTPATIENT)
Dept: OBSTETRICS AND GYNECOLOGY | Facility: CLINIC | Age: 39
End: 2024-11-14
Payer: COMMERCIAL

## 2024-11-14 VITALS
DIASTOLIC BLOOD PRESSURE: 81 MMHG | HEIGHT: 62 IN | SYSTOLIC BLOOD PRESSURE: 161 MMHG | BODY MASS INDEX: 20.45 KG/M2 | WEIGHT: 111.13 LBS | HEART RATE: 68 BPM

## 2024-11-14 DIAGNOSIS — Z01.419 WELL WOMAN EXAM WITH ROUTINE GYNECOLOGICAL EXAM: Primary | ICD-10-CM

## 2024-11-14 DIAGNOSIS — Z30.09 BIRTH CONTROL COUNSELING: ICD-10-CM

## 2024-11-14 PROCEDURE — 99999 PR PBB SHADOW E&M-EST. PATIENT-LVL III: CPT | Mod: PBBFAC,,, | Performed by: STUDENT IN AN ORGANIZED HEALTH CARE EDUCATION/TRAINING PROGRAM

## 2024-11-14 RX ORDER — DEXBROMPHENIRAMINE MALEATE, DEXTROMETHORPHAN HBR, PHENYLEPHRINE HCL 2; 20; 10 G/1; G/1; G/1
TABLET ORAL
COMMUNITY
Start: 2024-10-27

## 2024-11-14 RX ORDER — IBUPROFEN 800 MG/1
800 TABLET ORAL EVERY 8 HOURS PRN
Qty: 90 TABLET | Refills: 0 | Status: SHIPPED | OUTPATIENT
Start: 2024-11-14 | End: 2024-12-14

## 2024-11-14 RX ORDER — FLUTICASONE PROPIONATE 50 MCG
SPRAY, SUSPENSION (ML) NASAL
COMMUNITY
Start: 2024-10-27

## 2024-11-14 RX ORDER — AZELASTINE 1 MG/ML
SPRAY, METERED NASAL
COMMUNITY
Start: 2024-09-30

## 2024-11-14 RX ORDER — LIDOCAINE HYDROCHLORIDE 20 MG/ML
SOLUTION ORAL; TOPICAL
COMMUNITY
Start: 2024-08-21

## 2024-11-14 RX ORDER — PROMETHAZINE HYDROCHLORIDE AND DEXTROMETHORPHAN HYDROBROMIDE 6.25; 15 MG/5ML; MG/5ML
SYRUP ORAL
COMMUNITY
Start: 2024-08-21

## 2024-11-14 NOTE — PROGRESS NOTES
CC: Annual/Contraception     HPI: Pt is a 39 y.o.  female  who presents for routine annual exam and contraception counseling. Overall doing well. She uses depo for contraception but would like to switch to another contraceptive very interested in IUDs. She does not want STD screening. Has not gotten a period since starting depo shot 3 years ago (). No breast pain/changes, abnormal bleeding, pain with sex, changes in bowel/bladder habits, or HA.     FH:   Breast cancer: none  Colon cancer: Father  Ovarian cancer: none  Uterine cancer: none    Last pap smear: 2023  History of abnormal pap smears: None  STD history: none  Birth control: Depo   OB history:   Tobacco use: None    ROS:  GENERAL: Feeling well overall. Denies fever or chills.   SKIN: Denies rash or lesions.   HEAD: Denies head injury or headache.   NODES: Denies enlarged lymph nodes.   CHEST: Denies chest pain or shortness of breath.   CARDIOVASCULAR: Denies palpitations or left sided chest pain.   ABDOMEN: No abdominal pain, constipation, diarrhea, nausea, vomiting or rectal bleeding.   URINARY: No dysuria, hematuria, or burning on urination.  REPRODUCTIVE: See HPI.   BREASTS: Denies pain, lumps, or nipple discharge.   HEMATOLOGIC: No easy bruisability or excessive bleeding.   MUSCULOSKELETAL: Denies joint pain or swelling.   NEUROLOGIC: Denies syncope or weakness.   PSYCHIATRIC: Denies depression, anxiety or mood swings.    PE: Female chaperone present for exam  APPEARANCE: Well nourished, well developed, White female in no acute distress.  NODES: no cervical, supraclavicular, or inguinal lymphadenopathy  BREASTS: Symmetrical, no skin changes or visible lesions. No palpable masses, nipple discharge or adenopathy bilaterally.  ABDOMEN: Soft. No tenderness or masses. No distention. No hernias palpated.   VULVA: No lesions. Normal external female genitalia.  URETHRAL MEATUS: Normal size and location, no lesions, no prolapse.  URETHRA:  No masses, tenderness, or prolapse.  VAGINA: Moist. No lesions or lacerations noted. No abnormal discharge present. No odor present.   CERVIX: No lesions or discharge. No cervical motion tenderness.   UTERUS: Normal size, regular shape, mobile, non-tender.  ADNEXA: No tenderness. No fullness or masses palpated in the adnexal regions.   ANUS PERINEUM: Normal.      Diagnosis:    1. Well woman exam with routine gynecological exam    2. Birth control counseling    3. Counseling for birth control regarding intrauterine device (IUD)        Plan:     Orders Placed This Encounter    Device Authorization Order    ibuprofen (ADVIL,MOTRIN) 800 MG tablet       Patient was counseled today on contraceptive options: barrier, hormonal (OCPs, Depo-Provera, NuvaRing, Nexplanon), IUDs (Mirena, ParaGard), etc.  Full discussion regarding all contraceptive options including risks and benefits of each, emphasizing LARCs with the highest efficacy and patient satisfaction, and lowest discontinuation rates. Patient opts for Mirena IUD. Explained insertion process, common s/e, follow up ,and post insertion expectations/pain management (if needed).     Patient will RTC for IUD insertion on 12/26/24.   Instructed to take 800 mg Motrin 1-2 hours prior to insertion. Discussed using topical lidocaine on cervix to help decrease pain during insertion. Patient verbalized understanding.      Patient was counseled today on the new ACS guidelines for cervical cytology screening as well as the current recommendations for breast cancer screening. She was counseled to follow up with her PCP for other routine health maintenance. Counseling session lasted approximately 10 minutes, and all her questions were answered.  For women over the age of 65, you can stop having cervical cancer screenings if you have never had abnormal cervical cells or cervical cancer, and youve had three negative Pap tests in a row. (You also can stop screening if youve had two  negative Pap and HPV tests in a row in the past 10 years, with at least one test in the past 5 years.),    Follow-up with me in 1 year for routine exam; pap in 3-5 years.

## 2025-01-06 ENCOUNTER — TELEPHONE (OUTPATIENT)
Dept: NEUROSURGERY | Facility: CLINIC | Age: 40
End: 2025-01-06
Payer: COMMERCIAL

## 2025-01-06 DIAGNOSIS — D32.9 MENINGIOMA: Primary | ICD-10-CM

## 2025-01-06 DIAGNOSIS — G93.9 BRAIN LESION: ICD-10-CM

## 2025-01-06 NOTE — TELEPHONE ENCOUNTER
----- Message from Monica sent at 1/6/2025 12:26 PM CST -----  Regarding: received annual letter  Contact: [phone]  Pt called regarding a letter she received  about setting up her annual visit but it does not say for what. Patient believes it is for her annual visit for her brain scan.      Please call back at [phone]

## 2025-01-14 DIAGNOSIS — Z30.430 ENCOUNTER FOR IUD INSERTION: Primary | ICD-10-CM

## 2025-01-14 NOTE — TELEPHONE ENCOUNTER
----- Message from Emely sent at 1/14/2025  8:47 AM CST -----  Regarding: rx to soften cervix  Name of Who is Calling:  pt        What is the request in detail: pt is calling to ask if  can call in rx for her iud insertion Thursday to soften her cervix. Please call it into:   [id number]  [place], [place] - [address] AT [facility] OF [address] [facility] [address] [facility] [address]   Phone: [phone]  Fax: [phone]            [facility] Back [facility]: [phone]

## 2025-01-15 RX ORDER — MISOPROSTOL 200 UG/1
200 TABLET ORAL EVERY 6 HOURS
Qty: 2 TABLET | Refills: 0 | Status: SHIPPED | OUTPATIENT
Start: 2025-01-15 | End: 2025-01-16

## 2025-01-16 ENCOUNTER — PROCEDURE VISIT (OUTPATIENT)
Dept: OBSTETRICS AND GYNECOLOGY | Facility: CLINIC | Age: 40
End: 2025-01-16
Payer: COMMERCIAL

## 2025-01-16 VITALS — SYSTOLIC BLOOD PRESSURE: 102 MMHG | WEIGHT: 110.44 LBS | DIASTOLIC BLOOD PRESSURE: 64 MMHG | BODY MASS INDEX: 20.2 KG/M2

## 2025-01-16 DIAGNOSIS — Z30.430 ENCOUNTER FOR IUD INSERTION: Primary | ICD-10-CM

## 2025-01-16 PROCEDURE — 58300 INSERT INTRAUTERINE DEVICE: CPT | Mod: S$GLB,,, | Performed by: STUDENT IN AN ORGANIZED HEALTH CARE EDUCATION/TRAINING PROGRAM

## 2025-01-16 PROCEDURE — 99499 UNLISTED E&M SERVICE: CPT | Mod: S$GLB,,, | Performed by: STUDENT IN AN ORGANIZED HEALTH CARE EDUCATION/TRAINING PROGRAM

## 2025-01-16 NOTE — PROCEDURES
Insertion of IUD    Date/Time: 1/16/2025 1:45 PM    Performed by: Leandra Torres MD  Authorized by: Leandra Torres MD    Consent:     Consent given by:  Patient    Procedure risks and benefits discussed: yes      Patient questions answered: yes      Patient agrees, verbalizes understanding, and wants to proceed: yes     Device to be inserted was verified by patient: yes    Instructions and paperwork completed: yes    Insertion Procedure:   1 Intra Uterine Device levonorgestreL 52 mg       Pelvic exam performed: yes      Negative urine pregnancy test: yes      Cervix cleaned and prepped: yes      Speculum placed in vagina: yes      Tenaculum applied to cervix: yes      Uterus sound depth (cm):  7    IUD inserted with no complications: yes      IUD type:  Mirena    Strings trimmed: yes    Post-procedure:     Patient tolerated procedure well: yes      Patient will follow up after next period: yes    Comments:      Post-procedure counseling, expectations reviewed. Let us know if any issues arise.

## 2025-04-07 DIAGNOSIS — G93.9 CEREBRAL LESION: ICD-10-CM

## 2025-04-07 DIAGNOSIS — D32.9 MENINGIOMA: Primary | ICD-10-CM

## 2025-04-09 ENCOUNTER — HOSPITAL ENCOUNTER (OUTPATIENT)
Dept: RADIOLOGY | Facility: HOSPITAL | Age: 40
Discharge: HOME | End: 2025-04-09
Attending: NEUROLOGICAL SURGERY
Payer: COMMERCIAL

## 2025-04-09 DIAGNOSIS — G93.9 BRAIN LESION: ICD-10-CM

## 2025-04-09 DIAGNOSIS — D32.9 MENINGIOMA: ICD-10-CM

## 2025-04-09 PROCEDURE — 25500020 PHARM REV CODE 255: Performed by: NEUROLOGICAL SURGERY

## 2025-04-09 PROCEDURE — 70553 MRI BRAIN STEM W/O & W/DYE: CPT | Mod: TC

## 2025-04-09 PROCEDURE — A9585 GADOBUTROL INJECTION: HCPCS | Performed by: NEUROLOGICAL SURGERY

## 2025-04-09 PROCEDURE — 70553 MRI BRAIN STEM W/O & W/DYE: CPT | Mod: 26,,, | Performed by: RADIOLOGY

## 2025-04-09 RX ORDER — GADOBUTROL 604.72 MG/ML
5 INJECTION INTRAVENOUS
Status: COMPLETED | OUTPATIENT
Start: 2025-04-09 | End: 2025-04-09

## 2025-04-09 RX ADMIN — GADOBUTROL 5 ML: 604.72 INJECTION INTRAVENOUS at 12:04

## 2025-04-10 ENCOUNTER — OFFICE VISIT (OUTPATIENT)
Dept: NEUROSURGERY | Facility: CLINIC | Age: 40
End: 2025-04-10
Payer: COMMERCIAL

## 2025-04-10 VITALS
HEIGHT: 62 IN | SYSTOLIC BLOOD PRESSURE: 125 MMHG | HEART RATE: 75 BPM | BODY MASS INDEX: 20.32 KG/M2 | WEIGHT: 110.44 LBS | DIASTOLIC BLOOD PRESSURE: 77 MMHG

## 2025-04-10 DIAGNOSIS — D32.9 MENINGIOMA: Primary | ICD-10-CM

## 2025-04-10 PROCEDURE — 1159F MED LIST DOCD IN RCRD: CPT | Mod: CPTII,S$GLB,, | Performed by: NEUROLOGICAL SURGERY

## 2025-04-10 PROCEDURE — 3074F SYST BP LT 130 MM HG: CPT | Mod: CPTII,S$GLB,, | Performed by: NEUROLOGICAL SURGERY

## 2025-04-10 PROCEDURE — 1160F RVW MEDS BY RX/DR IN RCRD: CPT | Mod: CPTII,S$GLB,, | Performed by: NEUROLOGICAL SURGERY

## 2025-04-10 PROCEDURE — 99213 OFFICE O/P EST LOW 20 MIN: CPT | Mod: S$GLB,,, | Performed by: NEUROLOGICAL SURGERY

## 2025-04-10 PROCEDURE — 99999 PR PBB SHADOW E&M-EST. PATIENT-LVL III: CPT | Mod: PBBFAC,,, | Performed by: NEUROLOGICAL SURGERY

## 2025-04-10 PROCEDURE — 3078F DIAST BP <80 MM HG: CPT | Mod: CPTII,S$GLB,, | Performed by: NEUROLOGICAL SURGERY

## 2025-04-10 PROCEDURE — 3008F BODY MASS INDEX DOCD: CPT | Mod: CPTII,S$GLB,, | Performed by: NEUROLOGICAL SURGERY

## 2025-04-10 NOTE — PROGRESS NOTES
Ce Ag returned in neurosurgical follow-up to the office today.  She is a now 39-year-old lady who complained of headaches and a CT scan of the head done at [facility] on 03/24/2022 showed a small hyperdensity thought to maybe be blood along the anterior falx on the right side.  She was transferred to [facility] where MRI was felt to possibly show a small area bleeding.  CTA showed no abnormalities.  Follow-up MRI with contrast showed this more likely to be a small clacified parafalcine meningioma.  Follow-up MRI on 03/27/2023 showed showed no growth of the lesion.  She has occasional frontal headaches. She has had no complaint of visual loss weakness or other neurological symptoms.    On brief examination she is speaking clearly.  She shows full extraocular movements and equal pupils.  She shows no facial weakness.  Gait is normal.    MRI of the brain was repeated at [facility] on 4/10/2025.  Again is seen the small enhancing partially calcified lesion measuring about 9 mm in AP diameter along the right side of the anterior falx.  This is most consistent with meningioma.  It has shown no growth since first seen 3 years ago.    No neurosurgical intervention is required at this point.  I believe we can follow this up in about 2 years with MRI to be sure the lesion remains stable.

## 2025-06-12 ENCOUNTER — HOSPITAL ENCOUNTER (OUTPATIENT)
Dept: RADIOLOGY | Facility: HOSPITAL | Age: 40
Discharge: HOME | End: 2025-06-12
Attending: FAMILY MEDICINE
Payer: COMMERCIAL

## 2025-06-12 DIAGNOSIS — Z12.31 ENCOUNTER FOR SCREENING MAMMOGRAM FOR BREAST CANCER: ICD-10-CM

## 2025-06-12 PROCEDURE — 77063 BREAST TOMOSYNTHESIS BI: CPT | Mod: TC

## 2025-06-16 ENCOUNTER — RESULTS FOLLOW-UP (OUTPATIENT)
Dept: PRIMARY CARE CLINIC | Facility: CLINIC | Age: 40
End: 2025-06-16

## 2025-08-12 ENCOUNTER — OFFICE VISIT (OUTPATIENT)
Dept: PRIMARY CARE CLINIC | Facility: CLINIC | Age: 40
End: 2025-08-12
Payer: COMMERCIAL

## 2025-08-12 VITALS
HEIGHT: 62 IN | RESPIRATION RATE: 18 BRPM | TEMPERATURE: 98 F | SYSTOLIC BLOOD PRESSURE: 138 MMHG | DIASTOLIC BLOOD PRESSURE: 82 MMHG | HEART RATE: 68 BPM | OXYGEN SATURATION: 100 % | BODY MASS INDEX: 19.7 KG/M2 | WEIGHT: 107.06 LBS

## 2025-08-12 DIAGNOSIS — I10 ESSENTIAL HYPERTENSION, BENIGN: ICD-10-CM

## 2025-08-12 DIAGNOSIS — Z12.11 ENCOUNTER FOR COLORECTAL CANCER SCREENING: ICD-10-CM

## 2025-08-12 DIAGNOSIS — Z86.32 HISTORY OF GESTATIONAL DIABETES: ICD-10-CM

## 2025-08-12 DIAGNOSIS — D32.9 MENINGIOMA: ICD-10-CM

## 2025-08-12 DIAGNOSIS — Z80.0 FAMILY HISTORY OF COLON CANCER: ICD-10-CM

## 2025-08-12 DIAGNOSIS — Z00.00 ANNUAL PHYSICAL EXAM: Primary | ICD-10-CM

## 2025-08-12 DIAGNOSIS — Z12.12 ENCOUNTER FOR COLORECTAL CANCER SCREENING: ICD-10-CM

## 2025-08-12 DIAGNOSIS — Z12.31 ENCOUNTER FOR SCREENING MAMMOGRAM FOR BREAST CANCER: ICD-10-CM

## 2025-08-12 PROCEDURE — 3075F SYST BP GE 130 - 139MM HG: CPT | Mod: CPTII,S$GLB,, | Performed by: FAMILY MEDICINE

## 2025-08-12 PROCEDURE — 3079F DIAST BP 80-89 MM HG: CPT | Mod: CPTII,S$GLB,, | Performed by: FAMILY MEDICINE

## 2025-08-12 PROCEDURE — 99396 PREV VISIT EST AGE 40-64: CPT | Mod: S$GLB,,, | Performed by: FAMILY MEDICINE

## 2025-08-12 PROCEDURE — 1159F MED LIST DOCD IN RCRD: CPT | Mod: CPTII,S$GLB,, | Performed by: FAMILY MEDICINE

## 2025-08-12 PROCEDURE — 1160F RVW MEDS BY RX/DR IN RCRD: CPT | Mod: CPTII,S$GLB,, | Performed by: FAMILY MEDICINE

## 2025-08-12 PROCEDURE — 99999 PR PBB SHADOW E&M-EST. PATIENT-LVL IV: CPT | Mod: PBBFAC,,, | Performed by: FAMILY MEDICINE

## 2025-08-12 PROCEDURE — 3008F BODY MASS INDEX DOCD: CPT | Mod: CPTII,S$GLB,, | Performed by: FAMILY MEDICINE

## 2025-08-12 RX ORDER — LABETALOL 100 MG/1
50 TABLET, FILM COATED ORAL 2 TIMES DAILY
Qty: 90 TABLET | Refills: 3 | Status: SHIPPED | OUTPATIENT
Start: 2025-08-12